# Patient Record
Sex: MALE | Race: WHITE | Employment: OTHER | URBAN - METROPOLITAN AREA
[De-identification: names, ages, dates, MRNs, and addresses within clinical notes are randomized per-mention and may not be internally consistent; named-entity substitution may affect disease eponyms.]

---

## 2017-02-14 RX ORDER — ALLOPURINOL 300 MG/1
TABLET ORAL
Qty: 90 TABLET | Refills: 1 | Status: SHIPPED | OUTPATIENT
Start: 2017-02-14 | End: 2017-05-25

## 2017-02-14 RX ORDER — LEVOTHYROXINE SODIUM 0.05 MG/1
TABLET ORAL
Qty: 90 TABLET | Refills: 1 | Status: SHIPPED | OUTPATIENT
Start: 2017-02-14 | End: 2017-06-28

## 2017-02-14 RX ORDER — OMEPRAZOLE 20 MG/1
CAPSULE, DELAYED RELEASE ORAL
Qty: 180 CAPSULE | Refills: 3 | Status: SHIPPED | OUTPATIENT
Start: 2017-02-14 | End: 2017-12-21

## 2017-02-14 RX ORDER — GABAPENTIN 300 MG/1
CAPSULE ORAL
Qty: 180 CAPSULE | Refills: 1 | Status: SHIPPED | OUTPATIENT
Start: 2017-02-14 | End: 2017-05-25

## 2017-02-27 ENCOUNTER — LAB ENCOUNTER (OUTPATIENT)
Dept: LAB | Age: 62
End: 2017-02-27
Attending: FAMILY MEDICINE
Payer: MEDICARE

## 2017-02-27 DIAGNOSIS — I10 HYPERTENSION, BENIGN: Primary | ICD-10-CM

## 2017-02-27 DIAGNOSIS — G62.9 PERIPHERAL NEUROPATHY: ICD-10-CM

## 2017-02-27 DIAGNOSIS — E55.9 VITAMIN D DEFICIENCY: ICD-10-CM

## 2017-04-28 RX ORDER — FUROSEMIDE 80 MG
TABLET ORAL
Qty: 90 TABLET | Refills: 0 | Status: SHIPPED | OUTPATIENT
Start: 2017-04-28 | End: 2017-05-25

## 2017-04-28 RX ORDER — CITALOPRAM 40 MG/1
TABLET ORAL
Qty: 90 TABLET | Refills: 0 | Status: SHIPPED | OUTPATIENT
Start: 2017-04-28 | End: 2017-05-25

## 2017-04-28 RX ORDER — SPIRONOLACTONE 100 MG/1
TABLET, FILM COATED ORAL
Qty: 90 TABLET | Refills: 0 | Status: SHIPPED | OUTPATIENT
Start: 2017-04-28 | End: 2017-05-25

## 2017-04-28 RX ORDER — NAPROXEN 500 MG/1
TABLET ORAL
Qty: 180 TABLET | Refills: 0 | Status: SHIPPED | OUTPATIENT
Start: 2017-04-28 | End: 2017-05-25

## 2017-05-12 ENCOUNTER — TELEPHONE (OUTPATIENT)
Dept: FAMILY MEDICINE CLINIC | Facility: CLINIC | Age: 62
End: 2017-05-12

## 2017-05-12 NOTE — TELEPHONE ENCOUNTER
Fax received from [employer] on [address] In [place] asking for medication change request for Dewayne Tellez.   Medication is not covered by patient's insurance and would like Dr. Srini Farrell to consider changing the medication to symbicort or advair, both are coverd for

## 2017-05-14 RX ORDER — BUDESONIDE AND FORMOTEROL FUMARATE DIHYDRATE 80; 4.5 UG/1; UG/1
2 AEROSOL RESPIRATORY (INHALATION) 2 TIMES DAILY
Qty: 1 INHALER | Refills: 3 | Status: SHIPPED | OUTPATIENT
Start: 2017-05-14 | End: 2017-11-10

## 2017-05-26 RX ORDER — FUROSEMIDE 80 MG
TABLET ORAL
Qty: 90 TABLET | Refills: 0 | Status: SHIPPED | OUTPATIENT
Start: 2017-05-26 | End: 2017-09-11

## 2017-05-26 RX ORDER — SPIRONOLACTONE 100 MG/1
TABLET, FILM COATED ORAL
Qty: 90 TABLET | Refills: 0 | Status: SHIPPED | OUTPATIENT
Start: 2017-05-26 | End: 2017-09-11

## 2017-05-26 RX ORDER — ALLOPURINOL 300 MG/1
TABLET ORAL
Qty: 90 TABLET | Refills: 1 | Status: SHIPPED | OUTPATIENT
Start: 2017-05-26 | End: 2017-12-21

## 2017-05-26 RX ORDER — GABAPENTIN 300 MG/1
CAPSULE ORAL
Qty: 180 CAPSULE | Refills: 1 | Status: SHIPPED | OUTPATIENT
Start: 2017-05-26 | End: 2017-10-03

## 2017-05-26 RX ORDER — NAPROXEN 500 MG/1
TABLET ORAL
Qty: 180 TABLET | Refills: 0 | Status: SHIPPED | OUTPATIENT
Start: 2017-05-26 | End: 2017-09-11

## 2017-05-26 RX ORDER — CITALOPRAM 40 MG/1
TABLET ORAL
Qty: 90 TABLET | Refills: 0 | Status: SHIPPED | OUTPATIENT
Start: 2017-05-26 | End: 2017-09-11

## 2017-06-28 RX ORDER — NYSTATIN 100000 U/G
1 CREAM TOPICAL AS NEEDED
COMMUNITY
Start: 2013-07-10 | End: 2017-11-06

## 2017-06-28 RX ORDER — ALBUTEROL SULFATE 90 UG/1
2 AEROSOL, METERED RESPIRATORY (INHALATION) EVERY 6 HOURS PRN
COMMUNITY
Start: 2013-07-10 | End: 2017-09-09

## 2017-06-28 RX ORDER — AMIODARONE HYDROCHLORIDE 200 MG/1
100 TABLET ORAL DAILY
COMMUNITY
End: 2019-03-20

## 2017-06-29 RX ORDER — LEVOTHYROXINE SODIUM 0.05 MG/1
TABLET ORAL
Qty: 90 TABLET | Refills: 1 | Status: SHIPPED | OUTPATIENT
Start: 2017-06-29 | End: 2017-10-03 | Stop reason: DRUGHIGH

## 2017-09-07 ENCOUNTER — LABORATORY ENCOUNTER (OUTPATIENT)
Dept: LAB | Age: 62
End: 2017-09-07
Attending: FAMILY MEDICINE
Payer: MEDICARE

## 2017-09-07 DIAGNOSIS — I10 HYPERTENSION, BENIGN: ICD-10-CM

## 2017-09-07 DIAGNOSIS — E55.9 VITAMIN D DEFICIENCY: ICD-10-CM

## 2017-09-07 DIAGNOSIS — G62.9 PERIPHERAL NEUROPATHY: ICD-10-CM

## 2017-09-07 LAB
25-HYDROXYVITAMIN D (TOTAL): 44.5 NG/ML (ref 30–100)
ALBUMIN SERPL-MCNC: 3.1 G/DL (ref 3.5–4.8)
ALP LIVER SERPL-CCNC: 117 U/L (ref 45–117)
ALT SERPL-CCNC: 19 U/L (ref 17–63)
AST SERPL-CCNC: 15 U/L (ref 15–41)
BASOPHILS # BLD AUTO: 0.11 X10(3) UL (ref 0–0.1)
BASOPHILS NFR BLD AUTO: 0.9 %
BILIRUB SERPL-MCNC: 0.4 MG/DL (ref 0.1–2)
BUN BLD-MCNC: 27 MG/DL (ref 8–20)
CALCIUM BLD-MCNC: 9.3 MG/DL (ref 8.3–10.3)
CHLORIDE: 102 MMOL/L (ref 101–111)
CO2: 29 MMOL/L (ref 22–32)
CREAT BLD-MCNC: 2 MG/DL (ref 0.7–1.3)
EOSINOPHIL # BLD AUTO: 0.63 X10(3) UL (ref 0–0.3)
EOSINOPHIL NFR BLD AUTO: 5.3 %
ERYTHROCYTE [DISTWIDTH] IN BLOOD BY AUTOMATED COUNT: 14.6 % (ref 11.5–16)
FOLATE (FOLIC ACID), SERUM: 9 NG/ML (ref 8.7–24)
GLUCOSE BLD-MCNC: 87 MG/DL (ref 70–99)
HAV AB SERPL IA-ACNC: 1124 PG/ML (ref 193–986)
HCT VFR BLD AUTO: 43.7 % (ref 37–53)
HGB BLD-MCNC: 13.9 G/DL (ref 13–17)
IMMATURE GRANULOCYTE COUNT: 0.05 X10(3) UL (ref 0–1)
IMMATURE GRANULOCYTE RATIO %: 0.4 %
LYMPHOCYTES # BLD AUTO: 1.94 X10(3) UL (ref 0.9–4)
LYMPHOCYTES NFR BLD AUTO: 16.4 %
M PROTEIN MFR SERPL ELPH: 7.7 G/DL (ref 6.1–8.3)
MCH RBC QN AUTO: 31.9 PG (ref 27–33.2)
MCHC RBC AUTO-ENTMCNC: 31.8 G/DL (ref 31–37)
MCV RBC AUTO: 100.2 FL (ref 80–99)
MONOCYTES # BLD AUTO: 0.9 X10(3) UL (ref 0.1–0.6)
MONOCYTES NFR BLD AUTO: 7.6 %
NEUTROPHIL ABS PRELIM: 8.19 X10 (3) UL (ref 1.3–6.7)
NEUTROPHILS # BLD AUTO: 8.19 X10(3) UL (ref 1.3–6.7)
NEUTROPHILS NFR BLD AUTO: 69.4 %
PLATELET # BLD AUTO: 228 10(3)UL (ref 150–450)
POTASSIUM SERPL-SCNC: 4.2 MMOL/L (ref 3.6–5.1)
RBC # BLD AUTO: 4.36 X10(6)UL (ref 4.3–5.7)
RED CELL DISTRIBUTION WIDTH-SD: 52.9 FL (ref 35.1–46.3)
SODIUM SERPL-SCNC: 139 MMOL/L (ref 136–144)
TSI SER-ACNC: 1.33 MIU/ML (ref 0.35–5.5)
WBC # BLD AUTO: 11.8 X10(3) UL (ref 4–13)

## 2017-09-07 PROCEDURE — 36415 COLL VENOUS BLD VENIPUNCTURE: CPT | Performed by: FAMILY MEDICINE

## 2017-09-07 PROCEDURE — 82607 VITAMIN B-12: CPT | Performed by: FAMILY MEDICINE

## 2017-09-07 PROCEDURE — 82306 VITAMIN D 25 HYDROXY: CPT | Performed by: FAMILY MEDICINE

## 2017-09-07 PROCEDURE — 80053 COMPREHEN METABOLIC PANEL: CPT | Performed by: FAMILY MEDICINE

## 2017-09-07 PROCEDURE — 82746 ASSAY OF FOLIC ACID SERUM: CPT | Performed by: FAMILY MEDICINE

## 2017-09-07 PROCEDURE — 85025 COMPLETE CBC W/AUTO DIFF WBC: CPT | Performed by: FAMILY MEDICINE

## 2017-09-07 PROCEDURE — 84443 ASSAY THYROID STIM HORMONE: CPT | Performed by: FAMILY MEDICINE

## 2017-09-11 ENCOUNTER — OFFICE VISIT (OUTPATIENT)
Dept: FAMILY MEDICINE CLINIC | Facility: CLINIC | Age: 62
End: 2017-09-11

## 2017-09-11 VITALS
HEIGHT: 67.5 IN | SYSTOLIC BLOOD PRESSURE: 120 MMHG | BODY MASS INDEX: 48.86 KG/M2 | DIASTOLIC BLOOD PRESSURE: 70 MMHG | WEIGHT: 315 LBS | OXYGEN SATURATION: 98 % | TEMPERATURE: 98 F | RESPIRATION RATE: 17 BRPM | HEART RATE: 64 BPM

## 2017-09-11 DIAGNOSIS — E66.01 MORBID OBESITY (HCC): ICD-10-CM

## 2017-09-11 DIAGNOSIS — J44.9 COPD, SEVERE (HCC): ICD-10-CM

## 2017-09-11 DIAGNOSIS — E03.9 ACQUIRED HYPOTHYROIDISM: Primary | ICD-10-CM

## 2017-09-11 PROCEDURE — 99214 OFFICE O/P EST MOD 30 MIN: CPT | Performed by: FAMILY MEDICINE

## 2017-09-11 RX ORDER — LEVOTHYROXINE SODIUM 75 UG/1
75 CAPSULE ORAL
Qty: 90 CAPSULE | Refills: 3 | Status: SHIPPED | OUTPATIENT
Start: 2017-09-11 | End: 2017-09-20

## 2017-09-11 NOTE — PROGRESS NOTES
Chief Complaint:   Patient presents with:  Lab Results: discuss lab results      HPI:   This is a 57year old male coming in for follow-up appointment regarding hypothyroidism and morbid obesity. I reviewed his laboratory testing.   His thyroid numbers a 0.90 (H) 0.10 - 0.60 x10(3) uL   Eosinophil Absolute 0.63 (H) 0.00 - 0.30 x10(3) uL   Basophil Absolute 0.11 (H) 0.00 - 0.10 x10(3) uL   Immature Granulocyte Absolute 0.05 0.00 - 1.00 x10(3) uL   Neutrophil % 69.4 %   Lymphocyte % 16.4 %   Monocyte % 7.6 % Disp: 90 tablet Rfl: 0   NAPROXEN 500 MG Oral Tab TAKE 1 TABLET TWICE DAILY WITH FOOD Disp: 180 tablet Rfl: 0   CITALOPRAM HYDROBROMIDE 40 MG Oral Tab TAKE 1 TABLET EVERY DAY Disp: 90 tablet Rfl: 0   FUROSEMIDE 80 MG Oral Tab TAKE 1 TABLET EVERY DAY Disp: oriented, well developed, well nourished  male   , no apparent distress. HEENT:  Head:  Normocephalic, atraumatic    Mouth:  No oral lesions or ulcerations, poor dentition. NECK: Supple,  no JVD, no thyromegaly.   SKIN: stasis dermatitis LEs   No rashes anemia     Osteoarthrosis     Anxiety and depression     Periodic limb movement disorder     Edema     Hereditary and idiopathic peripheral neuropathy     Sleep apnea     Sleep arousal disorder     Vitamin D deficiency     Need for Streptococcus pneumoniae

## 2017-09-11 NOTE — PATIENT INSTRUCTIONS
Continue with current furosemide dosing     Increase thyroid replacement. Recheck of labs in about 3 months.

## 2017-09-12 RX ORDER — NAPROXEN 500 MG/1
TABLET ORAL
Qty: 180 TABLET | Refills: 0 | Status: SHIPPED | OUTPATIENT
Start: 2017-09-12 | End: 2017-12-21

## 2017-09-12 RX ORDER — CITALOPRAM 40 MG/1
TABLET ORAL
Qty: 90 TABLET | Refills: 1 | Status: SHIPPED | OUTPATIENT
Start: 2017-09-12 | End: 2017-12-20

## 2017-09-12 RX ORDER — SPIRONOLACTONE 100 MG/1
TABLET, FILM COATED ORAL
Qty: 90 TABLET | Refills: 0 | Status: SHIPPED | OUTPATIENT
Start: 2017-09-12 | End: 2017-12-21

## 2017-09-12 RX ORDER — FUROSEMIDE 80 MG
TABLET ORAL
Qty: 90 TABLET | Refills: 0 | Status: SHIPPED | OUTPATIENT
Start: 2017-09-12 | End: 2017-12-21

## 2017-09-20 ENCOUNTER — TELEPHONE (OUTPATIENT)
Dept: FAMILY MEDICINE CLINIC | Facility: CLINIC | Age: 62
End: 2017-09-20

## 2017-09-20 RX ORDER — LEVOTHYROXINE SODIUM 75 UG/1
75 CAPSULE ORAL
Qty: 90 CAPSULE | Refills: 3 | Status: SHIPPED | OUTPATIENT
Start: 2017-09-20 | End: 2017-12-20

## 2017-09-20 NOTE — TELEPHONE ENCOUNTER
Wife states that the insurance will not cover levothyroxine, it would cost $75 for a 30 day through [address].       I called [address] [place] [address] and they told me that if the patient has the levothyroxine filled through [facility]. it would cost  $18 for a 90 d

## 2017-10-03 ENCOUNTER — OFFICE VISIT (OUTPATIENT)
Dept: FAMILY MEDICINE CLINIC | Facility: CLINIC | Age: 62
End: 2017-10-03

## 2017-10-03 VITALS
SYSTOLIC BLOOD PRESSURE: 118 MMHG | BODY MASS INDEX: 48.86 KG/M2 | TEMPERATURE: 98 F | RESPIRATION RATE: 18 BRPM | DIASTOLIC BLOOD PRESSURE: 64 MMHG | HEART RATE: 64 BPM | HEIGHT: 67.5 IN | WEIGHT: 315 LBS

## 2017-10-03 DIAGNOSIS — L03.116 CELLULITIS OF LEFT LOWER EXTREMITY: Primary | ICD-10-CM

## 2017-10-03 PROCEDURE — 99214 OFFICE O/P EST MOD 30 MIN: CPT | Performed by: FAMILY MEDICINE

## 2017-10-03 RX ORDER — GABAPENTIN 300 MG/1
300 CAPSULE ORAL 3 TIMES DAILY
COMMUNITY
End: 2017-12-20

## 2017-10-03 RX ORDER — SULFAMETHOXAZOLE AND TRIMETHOPRIM 800; 160 MG/1; MG/1
1 TABLET ORAL 2 TIMES DAILY
Qty: 28 TABLET | Refills: 0 | Status: SHIPPED | OUTPATIENT
Start: 2017-10-03 | End: 2017-10-17

## 2017-10-03 NOTE — PROGRESS NOTES
Chief Complaint:   Patient presents with:  Leg Swelling: Patient stated he thinks he has cellulitis, left leg is swollen and getting worse      HPI:   This is a 57year old male coming in for redness and swelling in the left lower leg initially began aroun 8.19 (H) 1.30 - 6.70 x10(3) uL   Lymphocyte Absolute 1.94 0.90 - 4.00 x10(3) uL   Monocyte Absolute 0.90 (H) 0.10 - 0.60 x10(3) uL   Eosinophil Absolute 0.63 (H) 0.00 - 0.30 x10(3) uL   Basophil Absolute 0.11 (H) 0.00 - 0.10 x10(3) uL   Immature Granulocyt Oral Tab Take 2 tablets by mouth Q12H. Disp:  Rfl:    nystatin [id number] UNIT/GM External Cream Apply 1 Application topically as needed.  Disp:  Rfl:    ALLOPURINOL 300 MG Oral Tab TAKE 1 TABLET EVERY DAY Disp: 90 tablet Rfl: 1   Budesonide-Formoterol Fumarate lower leg, moderate swelling , dry chronic dermaitis on feet and heels. Left lateral thigh with erythema. No rashes, no skin lesion, no bruising, good turgor.     HEART:  Regular rate and rhythm, no murmurs,   LUNGS: Clear to auscultation bilterally, no Streptococcus pneumoniae vaccination      Braeden Flynn MD  10/3/2017  8:58 AM

## 2017-10-17 ENCOUNTER — OFFICE VISIT (OUTPATIENT)
Dept: FAMILY MEDICINE CLINIC | Facility: CLINIC | Age: 62
End: 2017-10-17

## 2017-10-17 VITALS
BODY MASS INDEX: 48.86 KG/M2 | DIASTOLIC BLOOD PRESSURE: 70 MMHG | HEIGHT: 67.5 IN | RESPIRATION RATE: 24 BRPM | TEMPERATURE: 97 F | HEART RATE: 74 BPM | WEIGHT: 315 LBS | SYSTOLIC BLOOD PRESSURE: 134 MMHG

## 2017-10-17 DIAGNOSIS — L03.116 CELLULITIS OF LEFT LOWER EXTREMITY: Primary | ICD-10-CM

## 2017-10-17 PROCEDURE — 99213 OFFICE O/P EST LOW 20 MIN: CPT | Performed by: FAMILY MEDICINE

## 2017-10-17 NOTE — PROGRESS NOTES
Chief Complaint:   Patient presents with: Other: 2 week follow up       HPI:   This is a 57year old male coming in for follow-up care regarding his cellulitis. See prior note.     Patient tolerated antibiotic feels that his leg is back to normal he has c uL   Neutrophil % 69.4 %   Lymphocyte % 16.4 %   Monocyte % 7.6 %   Eosinophil % 5.3 %   Basophil % 0.9 %   Immature Granulocyte % 0.4 %       Past Medical History (reviewed - no changes required):   morbid obesity;       marked pannus and plans for surger Inhalation Aerosol Inhale 2 puffs into the lungs 2 (two) times daily.  Disp: 1 Inhaler Rfl: 3   OMEPRAZOLE 20 MG Oral Capsule Delayed Release TAKE 1 CAPSULE TWICE DAILY Disp: 180 capsule Rfl: 3        Allergies:    Amlodipine                  Comment:Other cyanosis,FROM, 2+ dorsalis pedis pulses bilaterally. NEURO:  No deficit, normal gait, strength and tone, sensory intact,   PSYCH: mood is down today      ASSESSMENT AND PLAN:   1. Cellulitis of left lower extremity  Improved.       Patient Instructions

## 2017-10-20 ENCOUNTER — TELEPHONE (OUTPATIENT)
Dept: FAMILY MEDICINE CLINIC | Facility: CLINIC | Age: 62
End: 2017-10-20

## 2017-10-20 RX ORDER — CYCLOBENZAPRINE HCL 10 MG
10 TABLET ORAL 3 TIMES DAILY
Qty: 30 TABLET | Refills: 1 | Status: SHIPPED | OUTPATIENT
Start: 2017-10-20 | End: 2017-11-09

## 2017-10-20 NOTE — TELEPHONE ENCOUNTER
Spoke with wife  patient threw his back out emptying  yesterday. [facility] has had ongoing back problems and  is aware of this. [facility] is requesting a refill of Flexeril.  Not on patient's current med list but, has had this in the past. M

## 2017-10-20 NOTE — TELEPHONE ENCOUNTER
wife states that patient threw his out and needs to come in to see him but there is no availability today or tomorrow and they do not want to see anyone else - [facility] they can make appt with dr enamorado- will call back at a later t

## 2017-10-20 NOTE — TELEPHONE ENCOUNTER
Chart reviewed     [id number] [address] [place] for refill of flexeril ( cyclobenzaprine - generic)    Sent script in . Recommend appointment next week.

## 2017-11-06 NOTE — TELEPHONE ENCOUNTER
Future appt:     Your appointments     Date & Time Appointment Department [facility])    Dec 14, 2017 10:45 AM CST Laboratory Visit with REF Hayden  Reference Lab (EDW Ref Lab [facility])    Dec 20, 2017  4:00 PM CST Physical - Established Patient with R

## 2017-11-07 RX ORDER — NYSTATIN 100000 U/G
1 CREAM TOPICAL AS NEEDED
Qty: 30 G | Refills: 2 | Status: SHIPPED | OUTPATIENT
Start: 2017-11-07 | End: 2021-08-25

## 2017-11-10 RX ORDER — BUDESONIDE AND FORMOTEROL FUMARATE DIHYDRATE 80; 4.5 UG/1; UG/1
AEROSOL RESPIRATORY (INHALATION)
Qty: 1 INHALER | Refills: 3 | Status: SHIPPED | OUTPATIENT
Start: 2017-11-10 | End: 2018-07-12

## 2017-11-10 NOTE — TELEPHONE ENCOUNTER
Future appt:     Your appointments     Date & Time Appointment Department [facility])    Dec 14, 2017 10:45 AM CST Laboratory Visit with REF Paola Knapp Reference Lab (EDW Ref Lab [facility])    Dec 20, 2017  4:00 PM CST Physical - Established Patient with R

## 2017-12-13 ENCOUNTER — TELEPHONE (OUTPATIENT)
Dept: FAMILY MEDICINE CLINIC | Facility: CLINIC | Age: 62
End: 2017-12-13

## 2017-12-13 NOTE — TELEPHONE ENCOUNTER
Lab order needed for appt on 12/14/17, please advise    Future Appointments  Date Time Provider Shyam Fuentes   12/14/2017 10:45 AM [facility] [place] [facility]   12/20/2017 4:00 PM Rosita Schmid MD [facility]

## 2017-12-14 ENCOUNTER — LABORATORY ENCOUNTER (OUTPATIENT)
Dept: LAB | Age: 62
End: 2017-12-14
Attending: FAMILY MEDICINE
Payer: MEDICARE

## 2017-12-14 DIAGNOSIS — E03.9 ACQUIRED HYPOTHYROIDISM: ICD-10-CM

## 2017-12-14 DIAGNOSIS — D50.8 IRON DEFICIENCY ANEMIA SECONDARY TO INADEQUATE DIETARY IRON INTAKE: ICD-10-CM

## 2017-12-14 DIAGNOSIS — Z00.00 HEALTH CARE MAINTENANCE: ICD-10-CM

## 2017-12-14 DIAGNOSIS — N18.2 CHRONIC RENAL IMPAIRMENT, STAGE 2 (MILD): ICD-10-CM

## 2017-12-14 PROCEDURE — 80053 COMPREHEN METABOLIC PANEL: CPT | Performed by: FAMILY MEDICINE

## 2017-12-14 PROCEDURE — 36415 COLL VENOUS BLD VENIPUNCTURE: CPT | Performed by: FAMILY MEDICINE

## 2017-12-14 PROCEDURE — G0103 PSA SCREENING: HCPCS | Performed by: FAMILY MEDICINE

## 2017-12-20 ENCOUNTER — OFFICE VISIT (OUTPATIENT)
Dept: FAMILY MEDICINE CLINIC | Facility: CLINIC | Age: 62
End: 2017-12-20

## 2017-12-20 VITALS
DIASTOLIC BLOOD PRESSURE: 68 MMHG | TEMPERATURE: 99 F | BODY MASS INDEX: 48.86 KG/M2 | RESPIRATION RATE: 18 BRPM | WEIGHT: 315 LBS | SYSTOLIC BLOOD PRESSURE: 130 MMHG | HEART RATE: 78 BPM | HEIGHT: 67.5 IN

## 2017-12-20 DIAGNOSIS — I73.00 RAYNAUD'S PHENOMENON WITHOUT GANGRENE: ICD-10-CM

## 2017-12-20 DIAGNOSIS — E03.9 ACQUIRED HYPOTHYROIDISM: ICD-10-CM

## 2017-12-20 DIAGNOSIS — N18.30 CHRONIC RENAL INSUFFICIENCY, STAGE 3 (MODERATE) (HCC): ICD-10-CM

## 2017-12-20 DIAGNOSIS — J44.9 COPD, SEVERE (HCC): ICD-10-CM

## 2017-12-20 DIAGNOSIS — Z00.00 HEALTH CARE MAINTENANCE: Primary | ICD-10-CM

## 2017-12-20 DIAGNOSIS — E66.01 MORBID OBESITY (HCC): ICD-10-CM

## 2017-12-20 DIAGNOSIS — M19.012 PRIMARY OSTEOARTHRITIS OF LEFT SHOULDER: ICD-10-CM

## 2017-12-20 PROCEDURE — G0008 ADMIN INFLUENZA VIRUS VAC: HCPCS | Performed by: FAMILY MEDICINE

## 2017-12-20 PROCEDURE — 90686 IIV4 VACC NO PRSV 0.5 ML IM: CPT | Performed by: FAMILY MEDICINE

## 2017-12-20 PROCEDURE — 96160 PT-FOCUSED HLTH RISK ASSMT: CPT | Performed by: FAMILY MEDICINE

## 2017-12-20 RX ORDER — CITALOPRAM 40 MG/1
40 TABLET ORAL
Qty: 90 TABLET | Refills: 3 | Status: SHIPPED | OUTPATIENT
Start: 2017-12-20 | End: 2017-12-20

## 2017-12-20 RX ORDER — GABAPENTIN 300 MG/1
300 CAPSULE ORAL 3 TIMES DAILY
Qty: 90 CAPSULE | Refills: 3 | Status: SHIPPED | OUTPATIENT
Start: 2017-12-20 | End: 2017-12-21

## 2017-12-20 RX ORDER — LEVOTHYROXINE SODIUM 75 UG/1
75 CAPSULE ORAL
Qty: 90 CAPSULE | Refills: 3 | Status: SHIPPED | OUTPATIENT
Start: 2017-12-20 | End: 2021-08-25

## 2017-12-20 RX ORDER — GABAPENTIN 300 MG/1
300 CAPSULE ORAL 3 TIMES DAILY
Qty: 90 CAPSULE | Refills: 3 | Status: SHIPPED | OUTPATIENT
Start: 2017-12-20 | End: 2017-12-20

## 2017-12-20 RX ORDER — LEVOTHYROXINE SODIUM 75 UG/1
75 CAPSULE ORAL
Qty: 90 CAPSULE | Refills: 3 | Status: SHIPPED | OUTPATIENT
Start: 2017-12-20 | End: 2017-12-20

## 2017-12-20 RX ORDER — CITALOPRAM 40 MG/1
40 TABLET ORAL
Qty: 90 TABLET | Refills: 3 | Status: SHIPPED | OUTPATIENT
Start: 2017-12-20 | End: 2018-11-21

## 2017-12-20 NOTE — PROGRESS NOTES
Chief Complaint:   Patient presents with: Well Adult: Medicare wellness exam      HPI:   This is a 57year old male coming in for healthcare check along with review of chronic illnesses and new complaints.     I reviewed with him heart disease prevention, AST 14 (L) 15 - 41 U/L   Alt 15 (L) 17 - 63 U/L   Bilirubin, Total 0.5 0.1 - 2.0 mg/dL   Total Protein 8.0 6.1 - 8.3 g/dL   Albumin 3.4 (L) 3.5 - 4.8 g/dL   Sodium 139 136 - 144 mmol/L   Potassium 3.5 (L) 3.6 - 5.1 mmol/L   Chloride 100 (L) 101 - 111 mmo Comment:Other reaction(s): legs swell  Benazepril                  Comment:Other reaction(s): cronic cough       REVIEW OF SYSTEMS:   CONSTITUTIONAL:  Denies , fever, chills, or fatigue,    EENT:  Eyes:  Denies eye pain, visual changes,   Ears, Nos auscultation bilterally, no rales/rhonchi/wheezing. ABDOMEN:  Soft, nondistended, nontender, bowel sounds normal in all 4 quadrants, no masses, no hepatosplenomegaly. : deferred by patient    BACK: No tenderness, no spasm, SLR test negative, FROM.   E any questions, complications, allergies, or worsening or changing symptoms. Patient is to call with any side effects or complications from the treatments as a result of today.      Problem List:  Patient Active Problem List:     Morbid obesity ([facility] [address]     An

## 2017-12-20 NOTE — PATIENT INSTRUCTIONS
Good exam     Continue with citaloprim    Continue with good work with healthy nutrition     Recheck of labs (kidney function ) in 3 months - drink more water. Obtain xray of shoulder. Refill for medications.      Recheck with appointment with me in

## 2017-12-21 ENCOUNTER — HOSPITAL ENCOUNTER (OUTPATIENT)
Dept: GENERAL RADIOLOGY | Age: 62
Discharge: HOME | End: 2017-12-21
Attending: FAMILY MEDICINE
Payer: MEDICARE

## 2017-12-21 ENCOUNTER — TELEPHONE (OUTPATIENT)
Dept: FAMILY MEDICINE CLINIC | Facility: CLINIC | Age: 62
End: 2017-12-21

## 2017-12-21 DIAGNOSIS — M19.012 PRIMARY OSTEOARTHRITIS OF LEFT SHOULDER: ICD-10-CM

## 2017-12-21 PROBLEM — F32.4 MAJOR DEPRESSION IN PARTIAL REMISSION (HCC): Chronic | Status: ACTIVE | Noted: 2017-12-21

## 2017-12-21 PROCEDURE — 73030 X-RAY EXAM OF SHOULDER: CPT | Performed by: FAMILY MEDICINE

## 2017-12-21 NOTE — TELEPHONE ENCOUNTER
Patient's  wife calling stating needs refill of meds to [facility] states okay to wait for Dr. Neelima Beach    Future appt:     Your appointments     Date & Time Appointment Department [facility])    Mar 20, 2018 11:45 AM CDT Laboratory Visit with ALIREZA Darden

## 2017-12-21 NOTE — TELEPHONE ENCOUNTER
Spouse notified three Rx's that were sent [employer]  Levothyroxine, citalopram, and gabapentin. States will call pharmacy to verify.

## 2017-12-21 NOTE — TELEPHONE ENCOUNTER
Prescriptions went to [place], should of went to [facility]. The inhalers only go to [place], questions call the spouse.

## 2017-12-22 ENCOUNTER — TELEPHONE (OUTPATIENT)
Dept: FAMILY MEDICINE CLINIC | Facility: CLINIC | Age: 62
End: 2017-12-22

## 2017-12-22 RX ORDER — OMEPRAZOLE 20 MG/1
CAPSULE, DELAYED RELEASE ORAL
Qty: 180 CAPSULE | Refills: 3 | Status: SHIPPED | OUTPATIENT
Start: 2017-12-22 | End: 2018-11-21

## 2017-12-22 RX ORDER — SPIRONOLACTONE 100 MG/1
100 TABLET, FILM COATED ORAL
Qty: 90 TABLET | Refills: 0 | Status: SHIPPED | OUTPATIENT
Start: 2017-12-22 | End: 2017-12-22

## 2017-12-22 RX ORDER — FUROSEMIDE 80 MG
80 TABLET ORAL
Qty: 90 TABLET | Refills: 3 | Status: SHIPPED | OUTPATIENT
Start: 2017-12-22 | End: 2017-12-22

## 2017-12-22 RX ORDER — OMEPRAZOLE 20 MG/1
CAPSULE, DELAYED RELEASE ORAL
Qty: 180 CAPSULE | Refills: 3 | Status: SHIPPED | OUTPATIENT
Start: 2017-12-22 | End: 2017-12-22

## 2017-12-22 RX ORDER — NAPROXEN 500 MG/1
TABLET ORAL
Qty: 180 TABLET | Refills: 0 | Status: SHIPPED | OUTPATIENT
Start: 2017-12-22 | End: 2019-03-27

## 2017-12-22 RX ORDER — FUROSEMIDE 80 MG
80 TABLET ORAL
Qty: 90 TABLET | Refills: 3 | Status: SHIPPED | OUTPATIENT
Start: 2017-12-22 | End: 2018-11-21

## 2017-12-22 RX ORDER — ALLOPURINOL 300 MG/1
300 TABLET ORAL
Qty: 90 TABLET | Refills: 3 | Status: SHIPPED | OUTPATIENT
Start: 2017-12-22 | End: 2017-12-22

## 2017-12-22 RX ORDER — GABAPENTIN 300 MG/1
300 CAPSULE ORAL 3 TIMES DAILY
Qty: 90 CAPSULE | Refills: 3 | Status: SHIPPED | OUTPATIENT
Start: 2017-12-22 | End: 2017-12-22

## 2017-12-22 RX ORDER — SPIRONOLACTONE 100 MG/1
100 TABLET, FILM COATED ORAL
Qty: 90 TABLET | Refills: 3 | Status: SHIPPED | OUTPATIENT
Start: 2017-12-22 | End: 2018-11-21

## 2017-12-22 RX ORDER — ALLOPURINOL 300 MG/1
300 TABLET ORAL
Qty: 90 TABLET | Refills: 3 | Status: SHIPPED | OUTPATIENT
Start: 2017-12-22 | End: 2018-11-21

## 2017-12-22 RX ORDER — NAPROXEN 500 MG/1
TABLET ORAL
Qty: 180 TABLET | Refills: 0 | Status: SHIPPED | OUTPATIENT
Start: 2017-12-22 | End: 2017-12-22

## 2017-12-22 RX ORDER — GABAPENTIN 300 MG/1
300 CAPSULE ORAL 3 TIMES DAILY
Qty: 90 CAPSULE | Refills: 3 | Status: SHIPPED | OUTPATIENT
Start: 2017-12-22 | End: 2018-08-24

## 2017-12-22 NOTE — TELEPHONE ENCOUNTER
Wife states that the scripts should go to [address]. Scripts were cancelled at [facility].

## 2017-12-26 ENCOUNTER — TELEPHONE (OUTPATIENT)
Dept: FAMILY MEDICINE CLINIC | Facility: CLINIC | Age: 62
End: 2017-12-26

## 2017-12-26 DIAGNOSIS — R93.89 ABNORMAL X-RAY: Primary | ICD-10-CM

## 2017-12-26 NOTE — TELEPHONE ENCOUNTER
Patient informed of recent xray results and recommendations as per Dr. Ramiro Groves. Referral sent.

## 2017-12-26 NOTE — TELEPHONE ENCOUNTER
----- Message from Keisha Mark MD sent at 12/22/2017 12:06 PM CST -----  Xray report with advanced arthritis in shoulder     Recommend referral to dr. Janusz Moura

## 2018-01-22 ENCOUNTER — TELEPHONE (OUTPATIENT)
Dept: FAMILY MEDICINE CLINIC | Facility: CLINIC | Age: 63
End: 2018-01-22

## 2018-01-22 NOTE — TELEPHONE ENCOUNTER
had xray on shoulder, specialist not covered, Warren Almeida or Dr Katerine Hernandez these two are, need new referral and report before an appointment can be made phone number for them is [phone], the pain is so bad at night he fortunato and has been gi

## 2018-03-20 ENCOUNTER — APPOINTMENT (OUTPATIENT)
Dept: LAB | Age: 63
End: 2018-03-20
Attending: FAMILY MEDICINE
Payer: MEDICARE

## 2018-03-20 DIAGNOSIS — N18.30 CHRONIC RENAL INSUFFICIENCY, STAGE 3 (MODERATE) (HCC): ICD-10-CM

## 2018-03-20 DIAGNOSIS — N18.2 CHRONIC RENAL IMPAIRMENT, STAGE 2 (MILD): ICD-10-CM

## 2018-03-20 LAB
ALBUMIN SERPL-MCNC: 3.2 G/DL (ref 3.5–4.8)
ALP LIVER SERPL-CCNC: 83 U/L (ref 45–117)
ALT SERPL-CCNC: 9 U/L (ref 17–63)
AST SERPL-CCNC: 8 U/L (ref 15–41)
BILIRUB SERPL-MCNC: 0.4 MG/DL (ref 0.1–2)
BILIRUB UR QL STRIP.AUTO: NEGATIVE
BUN BLD-MCNC: 23 MG/DL (ref 8–20)
CALCIUM BLD-MCNC: 8.9 MG/DL (ref 8.3–10.3)
CHLORIDE: 100 MMOL/L (ref 101–111)
CLARITY UR REFRACT.AUTO: CLEAR
CO2: 30 MMOL/L (ref 22–32)
COLOR UR AUTO: YELLOW
CREAT BLD-MCNC: 1.72 MG/DL (ref 0.7–1.3)
GLUCOSE BLD-MCNC: 89 MG/DL (ref 70–99)
GLUCOSE UR STRIP.AUTO-MCNC: NEGATIVE MG/DL
KETONES UR STRIP.AUTO-MCNC: NEGATIVE MG/DL
LEUKOCYTE ESTERASE UR QL STRIP.AUTO: NEGATIVE
M PROTEIN MFR SERPL ELPH: 7.4 G/DL (ref 6.1–8.3)
NITRITE UR QL STRIP.AUTO: NEGATIVE
PH UR STRIP.AUTO: 5 [PH] (ref 4.5–8)
POTASSIUM SERPL-SCNC: 3.7 MMOL/L (ref 3.6–5.1)
PROT UR STRIP.AUTO-MCNC: NEGATIVE MG/DL
RBC UR QL AUTO: NEGATIVE
SODIUM SERPL-SCNC: 137 MMOL/L (ref 136–144)
SP GR UR STRIP.AUTO: 1.02 (ref 1–1.03)
UROBILINOGEN UR STRIP.AUTO-MCNC: <2 MG/DL

## 2018-03-20 PROCEDURE — 80053 COMPREHEN METABOLIC PANEL: CPT | Performed by: FAMILY MEDICINE

## 2018-03-20 PROCEDURE — 81003 URINALYSIS AUTO W/O SCOPE: CPT | Performed by: FAMILY MEDICINE

## 2018-03-20 PROCEDURE — 36415 COLL VENOUS BLD VENIPUNCTURE: CPT | Performed by: FAMILY MEDICINE

## 2018-03-21 ENCOUNTER — TELEPHONE (OUTPATIENT)
Dept: FAMILY MEDICINE CLINIC | Facility: CLINIC | Age: 63
End: 2018-03-21

## 2018-03-21 NOTE — TELEPHONE ENCOUNTER
----- Message from Jay Wheeler MD sent at 3/20/2018  5:35 PM CDT -----  Chart improved     Kidney function mildly improved. Continue with trying to increase fluid intake.

## 2018-04-16 RX ORDER — ALLOPURINOL 300 MG/1
TABLET ORAL
Qty: 90 TABLET | Refills: 1 | OUTPATIENT
Start: 2018-04-16

## 2018-04-16 NOTE — TELEPHONE ENCOUNTER
Allopurinol 300 mg tab daily  Last rf 12/22/17 w/ year supply, should not be out    Will deny w/ reason \"refill request too soon\"

## 2018-06-07 ENCOUNTER — TELEPHONE (OUTPATIENT)
Dept: FAMILY MEDICINE CLINIC | Facility: CLINIC | Age: 63
End: 2018-06-07

## 2018-06-07 NOTE — TELEPHONE ENCOUNTER
Is having dental work done and needs clearance that was sent 8 weeks ago returned. Had to cancel appt today because it was not sent.  Will resend request incase you did not get

## 2018-07-12 NOTE — TELEPHONE ENCOUNTER
Future appt: Your appointments     Date & Time Appointment Department [facility])    Jul 20, 2018  3:30 PM CDT Exam - Established Patient with Patricia Noland MD [address] [place] [address] ([facility])    Jul 31, 2018  3:40 PM CDT Sleep Follow Up with Karina Terry MD [address] [place] [address] ([facility])        [address] [place] [address] [facility] 6554 [phone]        Last Appointment:  Visit date not found  No results found for: CHOLEST, HDL, LDL, TRIGLY, TRIG  No results found for: EAG, A1C    Lab Results  Component Value Date   TSH 0.244 (L) 12/14/2017     Last RF:  110/10/2017    No Follow-up on file.

## 2018-07-13 RX ORDER — DILTIAZEM HYDROCHLORIDE 60 MG/1
TABLET, FILM COATED ORAL
Qty: 10.2 INHALER | Refills: 3 | Status: SHIPPED | OUTPATIENT
Start: 2018-07-13 | End: 2018-07-25

## 2018-07-20 ENCOUNTER — OFFICE VISIT (OUTPATIENT)
Dept: FAMILY MEDICINE CLINIC | Facility: CLINIC | Age: 63
End: 2018-07-20
Payer: MEDICARE

## 2018-07-20 ENCOUNTER — TELEPHONE (OUTPATIENT)
Dept: FAMILY MEDICINE CLINIC | Facility: CLINIC | Age: 63
End: 2018-07-20

## 2018-07-20 VITALS
HEART RATE: 78 BPM | TEMPERATURE: 98 F | HEIGHT: 67.5 IN | RESPIRATION RATE: 22 BRPM | DIASTOLIC BLOOD PRESSURE: 62 MMHG | BODY MASS INDEX: 48.86 KG/M2 | WEIGHT: 315 LBS | SYSTOLIC BLOOD PRESSURE: 136 MMHG

## 2018-07-20 DIAGNOSIS — N18.30 CHRONIC RENAL INSUFFICIENCY, STAGE 3 (MODERATE) (HCC): ICD-10-CM

## 2018-07-20 DIAGNOSIS — Z00.00 HEALTH CARE MAINTENANCE: Primary | ICD-10-CM

## 2018-07-20 DIAGNOSIS — F33.41 RECURRENT MAJOR DEPRESSIVE DISORDER, IN PARTIAL REMISSION (HCC): ICD-10-CM

## 2018-07-20 DIAGNOSIS — J44.9 COPD, SEVERE (HCC): ICD-10-CM

## 2018-07-20 DIAGNOSIS — E66.01 MORBID OBESITY (HCC): ICD-10-CM

## 2018-07-20 DIAGNOSIS — Z00.00 ENCOUNTER FOR ANNUAL HEALTH EXAMINATION: ICD-10-CM

## 2018-07-20 PROCEDURE — 96160 PT-FOCUSED HLTH RISK ASSMT: CPT | Performed by: FAMILY MEDICINE

## 2018-07-20 PROCEDURE — G0438 PPPS, INITIAL VISIT: HCPCS | Performed by: FAMILY MEDICINE

## 2018-07-20 NOTE — PATIENT INSTRUCTIONS
Good check    Continue with current medications. Obtain lab in near future. Plan for health check / physical / recheck in Feb. / march 2019.

## 2018-07-20 NOTE — PROGRESS NOTES
Chief Complaint:   Patient presents with: Follow - Up: medication refill      HPI:   This is a 57year old male coming in for healthcare check and review of chronic illnesses.   I discussed with patient heart disease prevention, cancer early detection and 3.5 - 4.8 g/dL   Sodium 137 136 - 144 mmol/L   Potassium 3.7 3.6 - 5.1 mmol/L   Chloride 100 (L) 101 - 111 mmol/L   CO2 30.0 22.0 - 32.0 mmol/L       Past Medical History (reviewed - no changes required):     morbid obesity;       marked pannus and plans f Resp 22   Ht 67.5\"   Wt (!) 390 lb   BMI 60.18 kg/m²  Estimated body mass index is 60.18 kg/m² as calculated from the following:    Height as of this encounter: 67.5\". Weight as of this encounter: 390 lb. Vital signs reviewed. Appears stated age, wel understanding. Patient is notified to call with any questions, complications, allergies, or worsening or changing symptoms. Patient is to call with any side effects or complications from the treatments as a result of today.      Problem List:  Patient Acti

## 2018-07-24 ENCOUNTER — OFFICE VISIT (OUTPATIENT)
Dept: FAMILY MEDICINE CLINIC | Facility: CLINIC | Age: 63
End: 2018-07-24
Payer: MEDICARE

## 2018-07-24 ENCOUNTER — APPOINTMENT (OUTPATIENT)
Dept: LAB | Age: 63
End: 2018-07-24
Attending: FAMILY MEDICINE
Payer: MEDICARE

## 2018-07-24 VITALS
BODY MASS INDEX: 48.86 KG/M2 | WEIGHT: 315 LBS | HEIGHT: 67.5 IN | SYSTOLIC BLOOD PRESSURE: 122 MMHG | TEMPERATURE: 98 F | DIASTOLIC BLOOD PRESSURE: 66 MMHG | HEART RATE: 80 BPM

## 2018-07-24 DIAGNOSIS — G47.33 OBSTRUCTIVE SLEEP APNEA (ADULT) (PEDIATRIC): Primary | ICD-10-CM

## 2018-07-24 DIAGNOSIS — N18.30 CHRONIC RENAL INSUFFICIENCY, STAGE 3 (MODERATE) (HCC): ICD-10-CM

## 2018-07-24 LAB
ALBUMIN SERPL-MCNC: 3.6 G/DL (ref 3.5–4.8)
ALBUMIN/GLOB SERPL: 0.8 {RATIO} (ref 1–2)
ALP LIVER SERPL-CCNC: 87 U/L (ref 45–117)
ALT SERPL-CCNC: 16 U/L (ref 17–63)
ANION GAP SERPL CALC-SCNC: 7 MMOL/L (ref 0–18)
AST SERPL-CCNC: 16 U/L (ref 15–41)
BILIRUB SERPL-MCNC: 0.5 MG/DL (ref 0.1–2)
BUN BLD-MCNC: 24 MG/DL (ref 8–20)
BUN/CREAT SERPL: 14.4 (ref 10–20)
CALCIUM BLD-MCNC: 8.9 MG/DL (ref 8.3–10.3)
CHLORIDE SERPL-SCNC: 101 MMOL/L (ref 101–111)
CO2 SERPL-SCNC: 31 MMOL/L (ref 22–32)
CREAT BLD-MCNC: 1.67 MG/DL (ref 0.7–1.3)
GLOBULIN PLAS-MCNC: 4.4 G/DL (ref 2.5–3.7)
GLUCOSE BLD-MCNC: 81 MG/DL (ref 70–99)
M PROTEIN MFR SERPL ELPH: 8 G/DL (ref 6.1–8.3)
OSMOLALITY SERPL CALC.SUM OF ELEC: 291 MOSM/KG (ref 275–295)
POTASSIUM SERPL-SCNC: 4.1 MMOL/L (ref 3.6–5.1)
SODIUM SERPL-SCNC: 139 MMOL/L (ref 136–144)

## 2018-07-24 PROCEDURE — 99214 OFFICE O/P EST MOD 30 MIN: CPT | Performed by: NURSE PRACTITIONER

## 2018-07-24 PROCEDURE — 80053 COMPREHEN METABOLIC PANEL: CPT

## 2018-07-24 PROCEDURE — 36415 COLL VENOUS BLD VENIPUNCTURE: CPT

## 2018-07-24 NOTE — PROGRESS NOTES
[facility]  SLEEP [facility]ESS [facility]        HPI:   This is a 57year old male coming in for Patient presents with:  Sleep Problem: follow up       HPI:     Present for CPAP follow-up. States that he is having all his teeth pulled in 2 weeks. reaction(s): legs swell  Benazepril                  Comment:Other reaction(s): cronic cough  Current Meds:    Current Outpatient Prescriptions:  Budesonide-Formoterol Fumarate (SYMBICORT) 80-4.5 MCG/ACT Inhalation Aerosol Inhale 2 puffs into the lungs 2 ( movement disorder     Edema     Hereditary and idiopathic peripheral neuropathy     Obstructive sleep apnea (adult) (pediatric)     Sleep arousal disorder     Vitamin D deficiency     Need for Streptococcus pneumoniae vaccination     Chronic renal insuffic heard.  Pulmonary/Chest: Effort normal and breath sounds normal. No respiratory distress. Musculoskeletal: Normal range of motion. Lymphadenopathy:     He has no cervical adenopathy. Neurological: He is alert and oriented to person, place, and time. complications from the treatments as a result of today.        [address] Mock  7/24/2018  1:13 PM

## 2018-07-25 ENCOUNTER — TELEPHONE (OUTPATIENT)
Dept: FAMILY MEDICINE CLINIC | Facility: CLINIC | Age: 63
End: 2018-07-25

## 2018-07-25 RX ORDER — BUDESONIDE AND FORMOTEROL FUMARATE DIHYDRATE 80; 4.5 UG/1; UG/1
2 AEROSOL RESPIRATORY (INHALATION) 2 TIMES DAILY
Qty: 10.2 INHALER | Refills: 3 | COMMUNITY
Start: 2018-07-25 | End: 2019-03-20

## 2018-07-25 NOTE — TELEPHONE ENCOUNTER
----- Message from Barbie Espinoza MD sent at 7/25/2018  8:25 AM CDT -----  Labs reviewed     Kidney function - creatinine - mildly improved. Continue with trying to drink plenty of water. Recheck in February with next office visit.     BS - normal

## 2018-07-25 NOTE — TELEPHONE ENCOUNTER
Pt's wife [facility]) notified of the below results and recommendations from Dr. Heidy Rivera. Verbalized understanding.

## 2018-07-25 NOTE — PATIENT INSTRUCTIONS
Continue using CPAP therapy. In order for medical supplies will be sent to [facility] Recheck in a month after having teeth pulled to ensure CPAP is working for him.     Warned if still with sleep apnea and not using CPAP has 7 fold increased risk and heart

## 2018-08-24 RX ORDER — GABAPENTIN 300 MG/1
300 CAPSULE ORAL 3 TIMES DAILY
Qty: 90 CAPSULE | Refills: 1 | Status: SHIPPED | OUTPATIENT
Start: 2018-08-24 | End: 2018-11-21

## 2018-08-24 NOTE — TELEPHONE ENCOUNTER
Please advise refill of gabapentin.      Future appt:    Last Appointment:  7/20/2018 for physical; plan for health check in Feb/March 2019  No results found for: CHOLEST, HDL, LDL, TRIGLY, TRIG  No results found for: EAG, A1C    Lab Results  Component Valu

## 2018-09-20 ENCOUNTER — TELEPHONE (OUTPATIENT)
Dept: FAMILY MEDICINE CLINIC | Facility: CLINIC | Age: 63
End: 2018-09-20

## 2018-09-20 DIAGNOSIS — J44.9 COPD, SEVERE (HCC): ICD-10-CM

## 2018-09-20 DIAGNOSIS — Z00.00 HEALTH CARE MAINTENANCE: Primary | ICD-10-CM

## 2018-09-20 NOTE — TELEPHONE ENCOUNTER
OTW til Friday 9/21       needs pneumonia / Flu  shots   need orders and to schedule  appt            call her on Friday

## 2018-09-21 ENCOUNTER — NURSE ONLY (OUTPATIENT)
Dept: FAMILY MEDICINE CLINIC | Facility: CLINIC | Age: 63
End: 2018-09-21

## 2018-09-21 VITALS — TEMPERATURE: 98 F

## 2018-09-21 DIAGNOSIS — Z23 NEED FOR VACCINATION: ICD-10-CM

## 2018-09-21 PROCEDURE — G0009 ADMIN PNEUMOCOCCAL VACCINE: HCPCS | Performed by: FAMILY MEDICINE

## 2018-09-21 PROCEDURE — 90653 IIV ADJUVANT VACCINE IM: CPT | Performed by: FAMILY MEDICINE

## 2018-09-21 PROCEDURE — 90670 PCV13 VACCINE IM: CPT | Performed by: FAMILY MEDICINE

## 2018-09-21 PROCEDURE — G0008 ADMIN INFLUENZA VIRUS VAC: HCPCS | Performed by: FAMILY MEDICINE

## 2018-09-21 NOTE — PROGRESS NOTES
Pt here for flu and prevnar 13 vaccinations. Prevnar 13 placed in right deltoid and flu in left deltoid. Pt tolerated both injections well.

## 2018-11-21 RX ORDER — FUROSEMIDE 80 MG
80 TABLET ORAL
Qty: 90 TABLET | Refills: 3 | Status: SHIPPED | OUTPATIENT
Start: 2018-11-21 | End: 2019-03-20

## 2018-11-21 RX ORDER — SPIRONOLACTONE 100 MG/1
100 TABLET, FILM COATED ORAL
Qty: 90 TABLET | Refills: 3 | Status: SHIPPED | OUTPATIENT
Start: 2018-11-21 | End: 2019-03-20

## 2018-11-21 RX ORDER — ALLOPURINOL 300 MG/1
300 TABLET ORAL
Qty: 90 TABLET | Refills: 3 | Status: SHIPPED | OUTPATIENT
Start: 2018-11-21 | End: 2019-03-20

## 2018-11-21 RX ORDER — CITALOPRAM 40 MG/1
40 TABLET ORAL
Qty: 90 TABLET | Refills: 3 | Status: SHIPPED | OUTPATIENT
Start: 2018-11-21 | End: 2019-03-20

## 2018-11-21 RX ORDER — LEVOTHYROXINE SODIUM 0.07 MG/1
TABLET ORAL
Qty: 90 TABLET | Refills: 3 | Status: SHIPPED | OUTPATIENT
Start: 2018-11-21 | End: 2019-03-20

## 2018-11-21 RX ORDER — GABAPENTIN 300 MG/1
CAPSULE ORAL
Qty: 180 CAPSULE | Refills: 1 | Status: SHIPPED | OUTPATIENT
Start: 2018-11-21 | End: 2019-03-20

## 2018-11-21 RX ORDER — OMEPRAZOLE 20 MG/1
CAPSULE, DELAYED RELEASE ORAL
Qty: 180 CAPSULE | Refills: 3 | Status: SHIPPED | OUTPATIENT
Start: 2018-11-21 | End: 2019-03-27

## 2018-11-21 NOTE — TELEPHONE ENCOUNTER
Please advise refills of levothyroxine, citalopram, spironolactone, furosemide, omeprazole, allopurinol, and gabapentin.      Future appt:    Last Appointment:  7/20/2018 physical; recheck in February/March  No results found for: Durene Heady, HDL, LDL, TRIGLY, Thank you very much for allowing me to evaluate Ms. Dawn. As you know she is a pleasant 27-year-old white female who for the past month or 2 has been having complaints of postprandial abdominal pain. She specifically has symptoms with coffee, she'll also have symptoms with spicy foods or tomato based products. She says the pain can be severe at times. It also radiates into the chest. She also reports reflux. There is no dysphagia, odynophagia or nausea or vomiting. She does use ibuprofen frequently and she social take up to 3 at that time. There is no melena or hematemesis. She does say the pain has been severe enough that it doubles her over. She's even had to leave work because of the pain. As you know she is now on Protonix and Carafate. I will started a couple weeks ago and there has been significant improvement in her symptoms. She unfortunately is a smoker, she is a social drinker.She has no lower GI complaints. Her bowels are regular. There is no diarrhea, constipation or bleeding. Family history is negative other than her mother has a history of irritable bowel syndrome. She's had 2 C-sections. She is in no distress, she does not look acutely ill.

## 2019-03-20 ENCOUNTER — OFFICE VISIT (OUTPATIENT)
Dept: FAMILY MEDICINE CLINIC | Facility: CLINIC | Age: 64
End: 2019-03-20
Payer: MEDICARE

## 2019-03-20 VITALS
RESPIRATION RATE: 18 BRPM | HEIGHT: 67 IN | TEMPERATURE: 99 F | SYSTOLIC BLOOD PRESSURE: 102 MMHG | WEIGHT: 315 LBS | DIASTOLIC BLOOD PRESSURE: 58 MMHG | BODY MASS INDEX: 49.44 KG/M2 | HEART RATE: 66 BPM

## 2019-03-20 DIAGNOSIS — M47.816 OSTEOARTHRITIS OF LUMBAR SPINE, UNSPECIFIED SPINAL OSTEOARTHRITIS COMPLICATION STATUS: Primary | ICD-10-CM

## 2019-03-20 DIAGNOSIS — J44.9 COPD, SEVERE (HCC): ICD-10-CM

## 2019-03-20 DIAGNOSIS — E66.01 MORBID OBESITY (HCC): ICD-10-CM

## 2019-03-20 PROCEDURE — 99214 OFFICE O/P EST MOD 30 MIN: CPT | Performed by: FAMILY MEDICINE

## 2019-03-20 RX ORDER — ALLOPURINOL 300 MG/1
300 TABLET ORAL
Qty: 90 TABLET | Refills: 3 | Status: SHIPPED | OUTPATIENT
Start: 2019-03-20 | End: 2019-07-14

## 2019-03-20 RX ORDER — FUROSEMIDE 80 MG
80 TABLET ORAL
Qty: 90 TABLET | Refills: 3 | Status: SHIPPED | OUTPATIENT
Start: 2019-03-20 | End: 2019-07-14

## 2019-03-20 RX ORDER — AMIODARONE HYDROCHLORIDE 200 MG/1
100 TABLET ORAL DAILY
Qty: 90 TABLET | Refills: 3 | Status: SHIPPED | OUTPATIENT
Start: 2019-03-20 | End: 2020-05-06

## 2019-03-20 RX ORDER — LEVOTHYROXINE SODIUM 0.07 MG/1
TABLET ORAL
Qty: 90 TABLET | Refills: 3 | Status: SHIPPED | OUTPATIENT
Start: 2019-03-20 | End: 2019-07-14

## 2019-03-20 RX ORDER — BUDESONIDE AND FORMOTEROL FUMARATE DIHYDRATE 80; 4.5 UG/1; UG/1
2 AEROSOL RESPIRATORY (INHALATION) 2 TIMES DAILY
Qty: 10.2 INHALER | Refills: 3 | Status: SHIPPED | OUTPATIENT
Start: 2019-03-20 | End: 2020-05-05

## 2019-03-20 RX ORDER — GABAPENTIN 400 MG/1
400 CAPSULE ORAL 3 TIMES DAILY
Qty: 90 CAPSULE | Refills: 3 | Status: SHIPPED | OUTPATIENT
Start: 2019-03-20 | End: 2019-08-01

## 2019-03-20 RX ORDER — SPIRONOLACTONE 100 MG/1
100 TABLET, FILM COATED ORAL
Qty: 90 TABLET | Refills: 3 | Status: SHIPPED | OUTPATIENT
Start: 2019-03-20 | End: 2019-07-14

## 2019-03-20 RX ORDER — CITALOPRAM 40 MG/1
40 TABLET ORAL
Qty: 90 TABLET | Refills: 3 | Status: SHIPPED | OUTPATIENT
Start: 2019-03-20 | End: 2019-07-14

## 2019-03-23 PROBLEM — F32.A DEPRESSION: Status: ACTIVE | Noted: 2019-03-23

## 2019-03-23 PROBLEM — E66.01 MORBID OBESITY WITH BODY MASS INDEX OF 70 AND OVER IN ADULT (HCC): Status: ACTIVE | Noted: 2017-09-11

## 2019-03-23 PROBLEM — M54.50 LOW BACK PAIN: Status: ACTIVE | Noted: 2019-03-23

## 2019-03-23 NOTE — PROGRESS NOTES
Chief Complaint:   Patient presents with:  Low Back Pain: started three days ago, no injury      HPI:   This is a 70year old male coming in for low back pain   Increase in pain this week ; long history of back pain.      No prior injury     Some radiation required):      disabled for obesity;    medically retired from [employer]. moved here from [place] for the past 2o years, and then [facility] for 10 years.   [place] 1 [place]              Current Meds:    Current Outpatient  Denies , fever, chills, or fatigue,     EENT:  Eyes:  Denies eye pain, visual changes,   Ears, Nose, Throat:  Denies hearing loss,or disturbance.  Denies sore throat  INTEGUMENTARY:  Denies rashes, itching.     CARDIOVASCULAR: Denies  chest discomfort, pal hepatosplenomegaly. : deferred by patient     BACK: dec ROM , mild tenderness on right lumbar region,   No muscle spasm     EXTREMITIES: left shoulder: marked crepitance, dec ROM       No edema, no cyanosis,FROM, 2+ dorsalis pedis pulses bilaterally.   Tamra Bernstein complications, allergies, or worsening or changing symptoms. Patient is to call with any side effects or complications from the treatments as a result of today.      Problem List:  Patient Active Problem List:     Morbid obesity with body mass index of 70

## 2019-03-27 NOTE — TELEPHONE ENCOUNTER
Please advise refills of omeprazole and naproxen. Patient has switched insurance and now has to use [facility] mail order pharmacy.      Future appt:    Last Appointment:  3/20/2019 back pain; recheck physical in July  No results found for: CHOLEST, HDL, LDL, TR

## 2019-03-28 RX ORDER — NAPROXEN 500 MG/1
TABLET ORAL
Qty: 180 TABLET | Refills: 0 | Status: SHIPPED | OUTPATIENT
Start: 2019-03-28 | End: 2019-07-14

## 2019-03-28 RX ORDER — OMEPRAZOLE 20 MG/1
20 CAPSULE, DELAYED RELEASE ORAL 2 TIMES DAILY
Qty: 180 CAPSULE | Refills: 0 | Status: SHIPPED | OUTPATIENT
Start: 2019-03-28 | End: 2019-04-12

## 2019-04-12 ENCOUNTER — TELEPHONE (OUTPATIENT)
Dept: FAMILY MEDICINE CLINIC | Facility: CLINIC | Age: 64
End: 2019-04-12

## 2019-04-12 NOTE — TELEPHONE ENCOUNTER
is needing RF of ameprozole to [facility]  ( should be on file)  Due to new Insuance coverage / pharmacy . .    pharmacy needs further info from provider  ( this has gone unanswered )

## 2019-04-12 NOTE — TELEPHONE ENCOUNTER
Patient's wife Bernarda Mcneill is calling stating that [id number] [facility] has been trying to contact us. Bernarda Herreranch had no information as to why. Bernarda Osito was instructed that she needs to find out what they need from us and then let us know.      Bernardanelson Mcneill verbalized

## 2019-04-13 NOTE — TELEPHONE ENCOUNTER
Please see note below and advise. Updated a note to the pharmacy in script stating medication cannot be substituted.      Future appt:    Last Appointment:  3/20/2019 f/u; recheck in July  No results found for: CHOLEST, HDL, LDL, TRIGLY, TRIG  No results fo

## 2019-04-16 RX ORDER — OMEPRAZOLE 20 MG/1
20 CAPSULE, DELAYED RELEASE ORAL 2 TIMES DAILY
Qty: 180 CAPSULE | Refills: 0 | Status: SHIPPED | OUTPATIENT
Start: 2019-04-16 | End: 2019-10-02

## 2019-04-16 NOTE — TELEPHONE ENCOUNTER
Called patient back to clarify why patient is taking omeprazole DR twice daily and if he has had any further testing done such as a scope. Patient's wife Raven Walters states patient takes it twice daily for his chronic indigestion.  Patient used to take it o

## 2019-07-15 NOTE — TELEPHONE ENCOUNTER
Received my chart refill request from patient. Patient was last seen March 2019 for back pain and instructed to follow up in July for his physical.     Patient has not scheduled. [employer] message sent to patient asking him to schedule medicare annual as Dr. Yaneth Tadeo is currently booking into late August/ early September. Please advise refills. Future appt:    Last Appointment:  3/20/2019 back pain  No results found for: CHOLEST, HDL, LDL, TRIGLY, TRIG  No results found for: EAG, A1C  Lab Results   Component Value Date    TSH 0.244 (L) 12/14/2017       No follow-ups on file. Last RF: 3/20/19 to [facility].  Patient wants them sent to [facility] mail order now

## 2019-07-16 NOTE — TELEPHONE ENCOUNTER
Patient's wife Emilee Sethi informed of the below and scheduled medicare annual for Vineet Leal. Please advise refills to [facility] order. Emilee Sethi is also asking if Dr. Padmini Morales can send a prescription to [facility] for tramadol to help with patient's pain. Emilee Sethi states they have done everything Dr. Ivan Hook advised they do and Vineet Leal is still in pain. Please advise.

## 2019-07-17 RX ORDER — NAPROXEN 500 MG/1
TABLET ORAL
Qty: 180 TABLET | Refills: 0 | Status: SHIPPED | OUTPATIENT
Start: 2019-07-17 | End: 2019-10-02

## 2019-07-17 RX ORDER — FUROSEMIDE 80 MG
80 TABLET ORAL
Qty: 90 TABLET | Refills: 0 | Status: SHIPPED | OUTPATIENT
Start: 2019-07-17 | End: 2019-10-09

## 2019-07-17 RX ORDER — ALLOPURINOL 300 MG/1
300 TABLET ORAL
Qty: 90 TABLET | Refills: 0 | Status: SHIPPED | OUTPATIENT
Start: 2019-07-17 | End: 2019-10-02

## 2019-07-17 RX ORDER — LEVOTHYROXINE SODIUM 0.07 MG/1
TABLET ORAL
Qty: 90 TABLET | Refills: 0 | Status: SHIPPED | OUTPATIENT
Start: 2019-07-17 | End: 2019-10-02

## 2019-07-17 RX ORDER — CITALOPRAM 40 MG/1
40 TABLET ORAL
Qty: 90 TABLET | Refills: 0 | Status: SHIPPED | OUTPATIENT
Start: 2019-07-17 | End: 2019-10-02

## 2019-07-17 RX ORDER — SPIRONOLACTONE 100 MG/1
100 TABLET, FILM COATED ORAL
Qty: 90 TABLET | Refills: 0 | Status: SHIPPED | OUTPATIENT
Start: 2019-07-17 | End: 2019-10-02

## 2019-07-22 NOTE — TELEPHONE ENCOUNTER
Refills were already sent in on 07/17/2019 - why is there another request ?? I do not recommend Tramadol for long term pain - recommend appointment with pain clinic.

## 2019-08-01 DIAGNOSIS — M47.816 OSTEOARTHRITIS OF LUMBAR SPINE, UNSPECIFIED SPINAL OSTEOARTHRITIS COMPLICATION STATUS: ICD-10-CM

## 2019-08-01 DIAGNOSIS — G89.4 CHRONIC PAIN SYNDROME: Primary | ICD-10-CM

## 2019-08-01 RX ORDER — GABAPENTIN 400 MG/1
400 CAPSULE ORAL 3 TIMES DAILY
Qty: 90 CAPSULE | Refills: 0 | Status: SHIPPED | OUTPATIENT
Start: 2019-08-01 | End: 2019-09-09

## 2019-08-01 NOTE — TELEPHONE ENCOUNTER
One refill provided, sees Dr. Caron Barcenas 08/28/19 for Physical.    Will sign, please fax script.

## 2019-08-01 NOTE — TELEPHONE ENCOUNTER
Received fax from [facility] requesting interim supply refill until scheduled appointment. Future appt:     Your appointments     Date & Time Appointment Department [facility])    Aug 28, 2019 11:00 AM CDT Medicare Annual Well Visit with Anna Veloz MD E

## 2019-09-09 DIAGNOSIS — M47.816 OSTEOARTHRITIS OF LUMBAR SPINE, UNSPECIFIED SPINAL OSTEOARTHRITIS COMPLICATION STATUS: ICD-10-CM

## 2019-09-09 RX ORDER — GABAPENTIN 400 MG/1
400 CAPSULE ORAL 3 TIMES DAILY
Qty: 90 CAPSULE | Refills: 0 | Status: SHIPPED | OUTPATIENT
Start: 2019-09-09 | End: 2019-10-08

## 2019-09-09 NOTE — TELEPHONE ENCOUNTER
Future appt:     Your appointments     Date & Time Appointment Department [facility])    Sep 18, 2019  4:00 PM CDT Medicare Annual Well Visit with Meenu Melendez MD [address] [place] [address]            Obie Valdez

## 2019-10-03 NOTE — TELEPHONE ENCOUNTER
Future appt:     Your appointments     Date & Time Appointment Department [facility])    Oct 11, 2019  2:45 PM CDT Medicare Annual Well Visit with Will Rai MD [address] [place] [address], [place] ([facility])            Akila Kauffman

## 2019-10-04 RX ORDER — OMEPRAZOLE 20 MG/1
CAPSULE, DELAYED RELEASE ORAL
Qty: 180 CAPSULE | Refills: 0 | Status: SHIPPED | OUTPATIENT
Start: 2019-10-04 | End: 2019-12-25

## 2019-10-04 RX ORDER — SPIRONOLACTONE 100 MG/1
TABLET, FILM COATED ORAL
Qty: 90 TABLET | Refills: 0 | Status: SHIPPED | OUTPATIENT
Start: 2019-10-04 | End: 2019-12-25

## 2019-10-04 RX ORDER — LEVOTHYROXINE SODIUM 0.07 MG/1
TABLET ORAL
Qty: 90 TABLET | Refills: 0 | Status: SHIPPED | OUTPATIENT
Start: 2019-10-04 | End: 2019-10-11 | Stop reason: ALTCHOICE

## 2019-10-04 RX ORDER — CITALOPRAM 40 MG/1
TABLET ORAL
Qty: 90 TABLET | Refills: 0 | Status: SHIPPED | OUTPATIENT
Start: 2019-10-04 | End: 2019-12-25

## 2019-10-04 RX ORDER — NAPROXEN 500 MG/1
TABLET ORAL
Qty: 180 TABLET | Refills: 0 | Status: SHIPPED | OUTPATIENT
Start: 2019-10-04 | End: 2019-12-25

## 2019-10-04 RX ORDER — ALLOPURINOL 300 MG/1
TABLET ORAL
Qty: 90 TABLET | Refills: 0 | Status: SHIPPED | OUTPATIENT
Start: 2019-10-04 | End: 2019-12-25

## 2019-10-08 DIAGNOSIS — M47.816 OSTEOARTHRITIS OF LUMBAR SPINE, UNSPECIFIED SPINAL OSTEOARTHRITIS COMPLICATION STATUS: ICD-10-CM

## 2019-10-08 NOTE — TELEPHONE ENCOUNTER
Future appt:     Your appointments     Date & Time Appointment Department [facility])    Oct 11, 2019  2:45 PM CDT Medicare Annual Well Visit with Melany Britt MD [address] [place] [address] [place], [place] ([place])            Eva Gasca

## 2019-10-09 RX ORDER — GABAPENTIN 400 MG/1
400 CAPSULE ORAL 3 TIMES DAILY
Qty: 90 CAPSULE | Refills: 0 | Status: SHIPPED | OUTPATIENT
Start: 2019-10-09 | End: 2019-11-21

## 2019-10-10 RX ORDER — FUROSEMIDE 80 MG
80 TABLET ORAL
Qty: 90 TABLET | Refills: 0 | Status: SHIPPED | OUTPATIENT
Start: 2019-10-10 | End: 2019-12-25

## 2019-10-10 NOTE — TELEPHONE ENCOUNTER
Future appt:     Your appointments     Date & Time Appointment Department [facility])    Oct 11, 2019  2:45 PM CDT Medicare Annual Well Visit with Mey Acosta MD [address] [place] [address]            Deandre French

## 2019-10-11 ENCOUNTER — OFFICE VISIT (OUTPATIENT)
Dept: FAMILY MEDICINE CLINIC | Facility: CLINIC | Age: 64
End: 2019-10-11
Payer: MEDICARE

## 2019-10-11 VITALS
TEMPERATURE: 98 F | SYSTOLIC BLOOD PRESSURE: 134 MMHG | OXYGEN SATURATION: 95 % | DIASTOLIC BLOOD PRESSURE: 62 MMHG | HEIGHT: 67 IN | BODY MASS INDEX: 49.44 KG/M2 | RESPIRATION RATE: 18 BRPM | WEIGHT: 315 LBS | HEART RATE: 66 BPM

## 2019-10-11 DIAGNOSIS — E03.9 ACQUIRED HYPOTHYROIDISM: ICD-10-CM

## 2019-10-11 DIAGNOSIS — I10 BENIGN HYPERTENSION: ICD-10-CM

## 2019-10-11 DIAGNOSIS — Z00.00 ENCOUNTER FOR ANNUAL HEALTH EXAMINATION: Primary | ICD-10-CM

## 2019-10-11 DIAGNOSIS — N18.30 CHRONIC RENAL INSUFFICIENCY, STAGE 3 (MODERATE) (HCC): ICD-10-CM

## 2019-10-11 DIAGNOSIS — F33.41 RECURRENT MAJOR DEPRESSIVE DISORDER, IN PARTIAL REMISSION (HCC): ICD-10-CM

## 2019-10-11 PROCEDURE — G0439 PPPS, SUBSEQ VISIT: HCPCS | Performed by: FAMILY MEDICINE

## 2019-10-11 PROCEDURE — 96160 PT-FOCUSED HLTH RISK ASSMT: CPT | Performed by: FAMILY MEDICINE

## 2019-10-11 PROCEDURE — 99397 PER PM REEVAL EST PAT 65+ YR: CPT | Performed by: FAMILY MEDICINE

## 2019-10-11 RX ORDER — MELATONIN 10 MG
1 CAPSULE ORAL NIGHTLY
COMMUNITY

## 2019-10-12 NOTE — PROGRESS NOTES
Chief Complaint:   Patient presents with: Well Adult      HPI:   This is a 75year old male coming in for healthcare check. I discussed with him heart disease prevention, cancer early detection and immunizations.     Immunizations: Patient up-to-date with History (reviewed - no changes required):              disabled for obesity;        medically retired from [employer]. moved here from [address] [place] [address] [employer] for the past 2o years, and then [facility] for 10 years.       has 1 daughter lives in [place] changes,   Ears, Nose, Throat:  Denies hearing loss,or disturbance.  Denies sore throat  INTEGUMENTARY:  Denies rashes, itching.     CARDIOVASCULAR: Denies  chest discomfort, palpitations, edema,  RESPIRATORY:  Denies shortness of breath, wheezing, cough or patient     BACK: No tenderness, no spasm, SLR test negative, FROM. EXTREMITIES: left shoulder: marked crepitance, dec ROM       No edema, no cyanosis,FROM, 2+ dorsalis pedis pulses bilaterally.   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

## 2019-10-25 ENCOUNTER — TELEPHONE (OUTPATIENT)
Dept: FAMILY MEDICINE CLINIC | Facility: CLINIC | Age: 64
End: 2019-10-25

## 2019-10-25 NOTE — TELEPHONE ENCOUNTER
----- Message from Jaclyn Lopez sent at 10/25/2019  3:55 PM CDT -----  [phone]     [facility]         Jaclyn Lopez, 10/25/19, 3:56 PM

## 2019-10-25 NOTE — TELEPHONE ENCOUNTER
Received cologuard report. Negative result noted. HM updated. Message left for patient to call the office back.

## 2019-11-05 ENCOUNTER — TELEPHONE (OUTPATIENT)
Dept: FAMILY MEDICINE CLINIC | Facility: CLINIC | Age: 64
End: 2019-11-05

## 2019-11-05 NOTE — TELEPHONE ENCOUNTER
----- Message from Satya Oates MD sent at 11/5/2019 11:51 AM CST -----  Labs reviewed     CBC normal     Chem profile - BS - normal   Kidney function - creatinine level 1.54 - elevated , but improved c/w last 2 years.    Continue with healthy nutrition

## 2019-11-21 DIAGNOSIS — M47.816 OSTEOARTHRITIS OF LUMBAR SPINE, UNSPECIFIED SPINAL OSTEOARTHRITIS COMPLICATION STATUS: ICD-10-CM

## 2019-11-21 RX ORDER — GABAPENTIN 400 MG/1
CAPSULE ORAL
Qty: 90 CAPSULE | Refills: 0 | OUTPATIENT
Start: 2019-11-21

## 2019-11-21 RX ORDER — GABAPENTIN 400 MG/1
400 CAPSULE ORAL 3 TIMES DAILY
Qty: 90 CAPSULE | Refills: 0 | Status: SHIPPED | OUTPATIENT
Start: 2019-11-21 | End: 2019-12-30

## 2019-11-21 NOTE — TELEPHONE ENCOUNTER
[facility] called stating that they sent a refill request for Gabapentin 400mg tid on 11/9 and have not received a response. I reviewed chart and do not see where we received a request. Pt is in the store now and not happy.  She is asking if someone else can d

## 2019-12-23 NOTE — TELEPHONE ENCOUNTER
Future appt:    Last Appointment with provider:   10/11/2019 physical; recheck 3 months  Last appointment at [facility]:  10/11/2019  CHOLESTEROL, TOTAL (mg/dL)   Date Value   11/04/2019 126     HDL CHOLESTEROL (mg/dL)   Date Value   11/04/2019 42     LD

## 2019-12-25 RX ORDER — FUROSEMIDE 80 MG
TABLET ORAL
Qty: 90 TABLET | Refills: 0 | Status: SHIPPED | OUTPATIENT
Start: 2019-12-25 | End: 2020-04-02

## 2019-12-25 RX ORDER — OMEPRAZOLE 20 MG/1
CAPSULE, DELAYED RELEASE ORAL
Qty: 180 CAPSULE | Refills: 0 | Status: SHIPPED | OUTPATIENT
Start: 2019-12-25 | End: 2020-03-13

## 2019-12-25 RX ORDER — LEVOTHYROXINE SODIUM 0.07 MG/1
TABLET ORAL
Qty: 90 TABLET | Refills: 0 | Status: SHIPPED | OUTPATIENT
Start: 2019-12-25 | End: 2020-04-02

## 2019-12-25 RX ORDER — ALLOPURINOL 300 MG/1
TABLET ORAL
Qty: 90 TABLET | Refills: 0 | Status: SHIPPED | OUTPATIENT
Start: 2019-12-25 | End: 2020-04-02

## 2019-12-25 RX ORDER — CITALOPRAM 40 MG/1
TABLET ORAL
Qty: 90 TABLET | Refills: 0 | Status: SHIPPED | OUTPATIENT
Start: 2019-12-25 | End: 2020-04-02

## 2019-12-25 RX ORDER — SPIRONOLACTONE 100 MG/1
TABLET, FILM COATED ORAL
Qty: 90 TABLET | Refills: 0 | Status: SHIPPED | OUTPATIENT
Start: 2019-12-25 | End: 2020-04-02

## 2019-12-25 RX ORDER — NAPROXEN 500 MG/1
TABLET ORAL
Qty: 180 TABLET | Refills: 0 | Status: SHIPPED | OUTPATIENT
Start: 2019-12-25 | End: 2020-03-13

## 2019-12-30 DIAGNOSIS — M47.816 OSTEOARTHRITIS OF LUMBAR SPINE, UNSPECIFIED SPINAL OSTEOARTHRITIS COMPLICATION STATUS: ICD-10-CM

## 2019-12-30 RX ORDER — GABAPENTIN 400 MG/1
400 CAPSULE ORAL 3 TIMES DAILY
Qty: 90 CAPSULE | Refills: 0 | Status: SHIPPED | OUTPATIENT
Start: 2019-12-30 | End: 2020-02-04

## 2019-12-30 NOTE — TELEPHONE ENCOUNTER
Dr. Evangelina To is out of office today. Please let patient know a refill was done, but that he is due for a follow-up with Dr. Evangelina To next month. Thank you.

## 2020-01-11 ENCOUNTER — TELEPHONE (OUTPATIENT)
Dept: FAMILY MEDICINE CLINIC | Facility: CLINIC | Age: 65
End: 2020-01-11

## 2020-01-11 NOTE — TELEPHONE ENCOUNTER
Spoke with Patient's wife Brissa Campos and scheduled the patient for Sleep FU on 1/16/20 with [facility]. Patient's wife verbalized understanding and has no further questions or concerns.

## 2020-01-21 ENCOUNTER — TELEPHONE (OUTPATIENT)
Dept: FAMILY MEDICINE CLINIC | Facility: CLINIC | Age: 65
End: 2020-01-21

## 2020-01-21 NOTE — TELEPHONE ENCOUNTER
Rescheduled patient to see [facility] on 2/3/20 for Sleep FU. Patient's wife verbalized understanding and has no further questions or concerns.

## 2020-02-03 DIAGNOSIS — M47.816 OSTEOARTHRITIS OF LUMBAR SPINE, UNSPECIFIED SPINAL OSTEOARTHRITIS COMPLICATION STATUS: ICD-10-CM

## 2020-02-03 NOTE — TELEPHONE ENCOUNTER
Future appt:    Last Appointment with provider:   10/11/2019;Recheck with me in 3 months.       Last appointment at [facility]:  10/11/2019  CHOLESTEROL, TOTAL (mg/dL)   Date Value   11/04/2019 126     HDL CHOLESTEROL (mg/dL)   Date Value   11/04/2019 42

## 2020-02-04 RX ORDER — GABAPENTIN 400 MG/1
400 CAPSULE ORAL 3 TIMES DAILY
Qty: 90 CAPSULE | Refills: 0 | Status: SHIPPED | OUTPATIENT
Start: 2020-02-04 | End: 2020-03-12

## 2020-02-04 NOTE — TELEPHONE ENCOUNTER
Patient's wife Brenda Orozco informed of the below and scheduled patient for f/u appt with Dr. Real Gutiérrez.      Future Appointments   Date Time Provider Shyam Fuentes   2/25/2020  2:00 PM Rochelle Roe MD [facility] [place] [facility]

## 2020-03-12 DIAGNOSIS — M47.816 OSTEOARTHRITIS OF LUMBAR SPINE, UNSPECIFIED SPINAL OSTEOARTHRITIS COMPLICATION STATUS: ICD-10-CM

## 2020-03-12 RX ORDER — GABAPENTIN 400 MG/1
400 CAPSULE ORAL 3 TIMES DAILY
Qty: 90 CAPSULE | Refills: 0 | Status: SHIPPED | OUTPATIENT
Start: 2020-03-12 | End: 2020-04-27

## 2020-03-13 RX ORDER — NAPROXEN 500 MG/1
TABLET ORAL
Qty: 180 TABLET | Refills: 0 | Status: SHIPPED | OUTPATIENT
Start: 2020-03-13 | End: 2020-07-01

## 2020-03-13 RX ORDER — OMEPRAZOLE 20 MG/1
CAPSULE, DELAYED RELEASE ORAL
Qty: 180 CAPSULE | Refills: 0 | Status: SHIPPED | OUTPATIENT
Start: 2020-03-13 | End: 2020-05-24

## 2020-03-13 NOTE — TELEPHONE ENCOUNTER
Future appt:    Last Appointment with provider:   10/11/2019 physical; recheck 3 months  Last appointment at [facility]:  10/11/2019  CHOLESTEROL, TOTAL (mg/dL)   Date Value   11/04/2019 126     HDL CHOLESTEROL (mg/dL)   Date Value   11/04/2019 42     LD

## 2020-03-17 ENCOUNTER — TELEPHONE (OUTPATIENT)
Dept: FAMILY MEDICINE CLINIC | Facility: CLINIC | Age: 65
End: 2020-03-17

## 2020-03-17 NOTE — TELEPHONE ENCOUNTER
Received fax from [facility] that patient's omeprazole DR prescription written for twice daily dosing is over the quantity limit for patient's plan. Plan only covers one capsule daily.      Complete PA and was given approval until 12/31/2020 this year

## 2020-04-02 RX ORDER — ALLOPURINOL 300 MG/1
TABLET ORAL
Qty: 90 TABLET | Refills: 0 | Status: SHIPPED | OUTPATIENT
Start: 2020-04-02 | End: 2020-07-01

## 2020-04-02 RX ORDER — LEVOTHYROXINE SODIUM 0.07 MG/1
TABLET ORAL
Qty: 90 TABLET | Refills: 0 | Status: SHIPPED | OUTPATIENT
Start: 2020-04-02 | End: 2020-07-01

## 2020-04-02 RX ORDER — SPIRONOLACTONE 100 MG/1
TABLET, FILM COATED ORAL
Qty: 90 TABLET | Refills: 0 | Status: SHIPPED | OUTPATIENT
Start: 2020-04-02 | End: 2020-07-01

## 2020-04-02 RX ORDER — CITALOPRAM 40 MG/1
TABLET ORAL
Qty: 90 TABLET | Refills: 0 | Status: SHIPPED | OUTPATIENT
Start: 2020-04-02 | End: 2020-07-01

## 2020-04-02 RX ORDER — FUROSEMIDE 80 MG
TABLET ORAL
Qty: 90 TABLET | Refills: 0 | Status: SHIPPED | OUTPATIENT
Start: 2020-04-02 | End: 2020-07-01

## 2020-04-02 NOTE — TELEPHONE ENCOUNTER
Future appt:    Last Appointment with provider:   10/11/2019  Last appointment at [facility]:  10/11/2019  CHOLESTEROL, TOTAL (mg/dL)   Date Value   11/04/2019 126     HDL CHOLESTEROL (mg/dL)   Date Value   11/04/2019 42     LDL-CHOLESTEROL (mg/dL (calc)

## 2020-04-06 RX ORDER — OMEPRAZOLE 20 MG/1
CAPSULE, DELAYED RELEASE ORAL
Qty: 60 CAPSULE | Refills: 0 | OUTPATIENT
Start: 2020-04-06

## 2020-04-21 ENCOUNTER — PATIENT MESSAGE (OUTPATIENT)
Dept: FAMILY MEDICINE CLINIC | Facility: CLINIC | Age: 65
End: 2020-04-21

## 2020-04-21 NOTE — TELEPHONE ENCOUNTER
From: Krupa Tony  To: Roger Mendoza MD  Sent: 4/21/2020 9:56 AM CDT  Subject: Prescription Question    Due to Covid-19 I am reluctant to make an appointment, however I'm in need of new mask for my cpap.  If possible would you call a prescription for the

## 2020-04-27 DIAGNOSIS — M47.816 OSTEOARTHRITIS OF LUMBAR SPINE, UNSPECIFIED SPINAL OSTEOARTHRITIS COMPLICATION STATUS: ICD-10-CM

## 2020-04-28 ENCOUNTER — TELEPHONE (OUTPATIENT)
Dept: FAMILY MEDICINE CLINIC | Facility: CLINIC | Age: 65
End: 2020-04-28

## 2020-04-28 RX ORDER — GABAPENTIN 400 MG/1
400 CAPSULE ORAL 3 TIMES DAILY
Qty: 90 CAPSULE | Refills: 1 | OUTPATIENT
Start: 2020-04-28

## 2020-04-28 NOTE — TELEPHONE ENCOUNTER
Please advise if patient should be scheduled for phone or video visit? Future appt:    Last Appointment with provider: 10/11/2019 physical; recheck in 3 months  Last appointment at [facility]:  Visit date not found  CHOLESTEROL, TOTAL (mg/dL)   Date Value   11/04/2019 126     HDL CHOLESTEROL (mg/dL)   Date Value   11/04/2019 42     LDL-CHOLESTEROL (mg/dL (calc))   Date Value   11/04/2019 68     TRIGLYCERIDES (mg/dL)   Date Value   11/04/2019 84     No results found for: EAG, A1C  Lab Results   Component Value Date    TSH 0.244 (L) 12/14/2017    TSHT4 0.92 11/04/2019       No follow-ups on file.

## 2020-04-28 NOTE — TELEPHONE ENCOUNTER
See mychart refill encounter. This is a duplicate request. Denied the e-scribe as refill can be sent from the other encounter without giving patient a notice of denial via [employer].

## 2020-04-28 NOTE — TELEPHONE ENCOUNTER
needs appt via phonevisit ( wife is [place] pt and they do not have Video on computer )     needs RF of Gabapentin     consent given by Sweetie Pacheco to have Phone visit

## 2020-04-28 NOTE — TELEPHONE ENCOUNTER
----- Message from Andrew Marques sent at 4/28/2020  4:04 PM CDT -----  Regarding: rc  And can be reached back at [phone].  Thank you

## 2020-04-28 NOTE — TELEPHONE ENCOUNTER
Please see other refill encounter from yesterday. Gabapentin pended in that encounter. Please advise scheduling recommendations.      Patient consented to a phone visit with Clay Marques.

## 2020-04-28 NOTE — TELEPHONE ENCOUNTER
Left message for patient's wife Susie Starr to call the office back. Per Dr. Humza Terry patient to be scheduled for phone visit tomorrow morning preferably.

## 2020-04-28 NOTE — TELEPHONE ENCOUNTER
Spoke with patient's wife Sasha Franco. States patient doesn't drive and she has cancer and was informed not to go near any doctor's offices.       Wife will have her niece bring in patient's chip this Thursday, 4/30/2020, in the afternoon to have it downloade

## 2020-04-28 NOTE — TELEPHONE ENCOUNTER
Patient was due for an appt in January. Future appt:    Last Appointment with provider:   10/11/2019; Recheck with me in 3 months.      Last appointment at [facility]:  Visit date not found  CHOLESTEROL, TOTAL (mg/dL)   Date Value   11/04/2019 126

## 2020-04-28 NOTE — TELEPHONE ENCOUNTER
Patient is not current in Care . He will need to come to the office with his SD card on a day that Dr. Flako Coello or myself are in the office.

## 2020-04-28 NOTE — TELEPHONE ENCOUNTER
Ewa Robb  verbally consents to a Virtual/Telephone Check-In service on 5/4/2020. Patient understands and accepts financial responsibility for any deductible, co-insurance and/or co-pays associated with this service.     set up MALA f/up on 5/4 @ 1pm

## 2020-04-29 ENCOUNTER — VIRTUAL PHONE E/M (OUTPATIENT)
Dept: FAMILY MEDICINE CLINIC | Facility: CLINIC | Age: 65
End: 2020-04-29

## 2020-04-29 DIAGNOSIS — M47.816 OSTEOARTHRITIS OF LUMBAR SPINE, UNSPECIFIED SPINAL OSTEOARTHRITIS COMPLICATION STATUS: Primary | ICD-10-CM

## 2020-04-29 DIAGNOSIS — E66.01 MORBID OBESITY (HCC): ICD-10-CM

## 2020-04-29 DIAGNOSIS — J44.9 COPD, SEVERE (HCC): ICD-10-CM

## 2020-04-29 DIAGNOSIS — I10 BENIGN HYPERTENSION: ICD-10-CM

## 2020-04-29 DIAGNOSIS — G89.4 CHRONIC PAIN SYNDROME: ICD-10-CM

## 2020-04-29 PROCEDURE — 99443 PHONE E/M BY PHYS 21-30 MIN: CPT | Performed by: FAMILY MEDICINE

## 2020-04-29 RX ORDER — GABAPENTIN 400 MG/1
400 CAPSULE ORAL 3 TIMES DAILY
Qty: 270 CAPSULE | Refills: 3 | Status: SHIPPED | OUTPATIENT
Start: 2020-04-29 | End: 2020-09-24

## 2020-04-29 NOTE — PROGRESS NOTES
Chief Complaint:   Patient presents with: Follow - Up: back pain      Phone visit done in [facility] of corona virus pandemic emergency. Phone consent done. HPI:   This is a 75year old male follow up care.    Patient needing refills  - patient last se REFLEX TO FREE T4   Result Value Ref Range    TSH W/REFLEX TO FT4 0.92 0.40 - 4.50 mIU/L   LIPID PANEL   Result Value Ref Range    CHOLESTEROL, TOTAL 126 <200 mg/dL    HDL CHOLESTEROL 42 >40 mg/dL    TRIGLYCERIDES 84 <150 mg/dL    LDL-CHOLESTEROL 68 mg/dL children: Not on file      Years of education: Not on file      Highest education level: Not on file    Tobacco Use      Smoking status: Former Smoker        Years: 16.00        Types: Cigarettes      Smokeless tobacco: Never Used    Substance and Sexual A Tab Take 0.5 tablets (100 mg total) by mouth daily. 90 tablet 3   • Budesonide-Formoterol Fumarate (SYMBICORT) 80-4.5 MCG/ACT Inhalation Aerosol Inhale 2 puffs into the lungs 2 (two) times daily. Rinse mouth after use.  10.2 Inhaler 3   • Levothyroxine Sodi cough during visit, no respiratory distress. PSYCH: no depression or anxiety noted. ASSESSMENT AND PLAN:   1. Osteoarthritis of lumbar spine, unspecified spinal osteoarthritis complication status  3.  Chronic pain syndrome  Continue with current MD  4/29/2020  1:53 PM      Time spent : 25 minutes.

## 2020-04-29 NOTE — TELEPHONE ENCOUNTER
Patient was scheduled for phone visit this afternoon with Dr. Junior Reece.      Future Appointments   Date Time Provider [facility]   4/29/2020  1:30 PM Christian Hahn MD [facility] [place] [facility] [place]   5/4/2020  1:00 PM Paris Beaver APRN [facility] [place] [facility]

## 2020-04-30 VITALS — DIASTOLIC BLOOD PRESSURE: 78 MMHG | HEART RATE: 78 BPM | SYSTOLIC BLOOD PRESSURE: 136 MMHG

## 2020-05-04 ENCOUNTER — VIRTUAL PHONE E/M (OUTPATIENT)
Dept: FAMILY MEDICINE CLINIC | Facility: CLINIC | Age: 65
End: 2020-05-04
Payer: MEDICARE

## 2020-05-04 VITALS — BODY MASS INDEX: 60 KG/M2 | WEIGHT: 315 LBS

## 2020-05-04 DIAGNOSIS — G47.33 OSA (OBSTRUCTIVE SLEEP APNEA): Primary | ICD-10-CM

## 2020-05-04 PROCEDURE — 99442 PHONE E/M BY PHYS 11-20 MIN: CPT | Performed by: NURSE PRACTITIONER

## 2020-05-04 NOTE — TELEPHONE ENCOUNTER
Future appt:    Last Appointment with provider: 10/11/2019      Last appointment at [facility]:  Visit date not found  CHOLESTEROL, TOTAL (mg/dL)   Date Value   11/04/2019 126     HDL CHOLESTEROL (mg/dL)   Date Value   11/04/2019 42     LDL-CHOLESTEROL (

## 2020-05-04 NOTE — PROGRESS NOTES
[address] [place] [address]  SLEEP PROGRESS NOTE    Brandie Morgan  verbally consents to a Virtual/Telephone Check-In service on 5/4/2020.     Patient understands and accepts financial responsibility for any deductible, co-insurance and/or co-pays associat ([facility] 12/20/2017   • COPD (chronic obstructive pulmonary disease) (HCC)    • COPD, severe ([facility] 3/8/2016   • Depression    • Hypothyroidism    • Iron deficiency anemia 12/13/2013   • Obesity    • Osteoarthrosis 7/10/2013   • Primary osteoarthritis of left LEVOTHYROXINE SODIUM 75 MCG Oral Tab TAKE 1 TABLET EVERY MORNINGBEFORE BREAKFAST 90 tablet 0   • FUROSEMIDE 80 MG Oral Tab TAKE 1 TABLET ONCE DAILY 90 tablet 0   • SPIRONOLACTONE 100 MG Oral Tab TAKE 1 TABLET DAILY 90 tablet 0   • ALLOPURINOL 300 MG Oral T 3 (moderate) (HCC)     Raynaud's phenomenon without gangrene     Major depression in partial remission (HCC)     Low back pain     HTN (hypertension)     GERD (gastroesophageal reflux disease)     Abdominal pannus     Asthma     Depression     Former smoke has a  7 fold increase in risk of heart attack, stroke, abnormal heart rhythm  and death,  increased risk of driving accidents. Advised to refrain from driving when sleepy.     COMPLIANCE is required by insurance for 4 hours a night most nights of the wee

## 2020-05-04 NOTE — PATIENT INSTRUCTIONS
Order for new machine and supplies to [facility]. Continue sleep therapy. Follow-up in 31 - 45 days on the new machine - sooner if needed.      Advised if still with sleep apnea and not using CPAP has a  7 fold increase in risk of heart attack, stroke, abn

## 2020-05-05 ENCOUNTER — TELEPHONE (OUTPATIENT)
Dept: FAMILY MEDICINE CLINIC | Facility: CLINIC | Age: 65
End: 2020-05-05

## 2020-05-05 RX ORDER — BUDESONIDE AND FORMOTEROL FUMARATE DIHYDRATE 80; 4.5 UG/1; UG/1
2 AEROSOL RESPIRATORY (INHALATION) 2 TIMES DAILY
Qty: 3 INHALER | Refills: 3 | Status: SHIPPED | OUTPATIENT
Start: 2020-05-05 | End: 2021-08-25

## 2020-05-05 NOTE — TELEPHONE ENCOUNTER
Future appt:    Last Appointment with provider:   Visit date not found  Last appointment at [facility]:  Visit date not found  CHOLESTEROL, TOTAL (mg/dL)   Date Value   11/04/2019 126     HDL CHOLESTEROL (mg/dL)   Date Value   11/04/2019 42     LDL-CHOLESTEROL (mg/dL (calc))   Date Value   11/04/2019 68     TRIGLYCERIDES (mg/dL)   Date Value   11/04/2019 84     No results found for: EAG, A1C  Lab Results   Component Value Date    TSH 0.244 (L) 12/14/2017    TSHT4 0.92 11/04/2019       No follow-ups on file. Recheck with me in 3 months.

## 2020-05-05 NOTE — TELEPHONE ENCOUNTER
Future appt:    Last Appointment with provider:   Visit date not found  Last appointment at [facility]:  Visit date not found  Last px:  10/11/19-  Pt was due for recheck in 3months  Pt had Video visit on 4/29/20      Symbicort refilled on 3/20/19-  #102

## 2020-05-05 NOTE — TELEPHONE ENCOUNTER
RF Symacort inhaler    (  they do home deliveries 1x wkly which is today @ 930am )   Please advise ASAP

## 2020-05-06 RX ORDER — AMIODARONE HYDROCHLORIDE 200 MG/1
TABLET ORAL
Qty: 90 TABLET | Refills: 1 | Status: SHIPPED | OUTPATIENT
Start: 2020-05-06 | End: 2021-08-25

## 2020-05-13 ENCOUNTER — TELEPHONE (OUTPATIENT)
Dept: FAMILY MEDICINE CLINIC | Facility: CLINIC | Age: 65
End: 2020-05-13

## 2020-05-13 NOTE — TELEPHONE ENCOUNTER
Wife states that [employer]  was supposed to order a new machine and mask, states [facility] told wife they never received the request.

## 2020-05-26 RX ORDER — OMEPRAZOLE 20 MG/1
20 CAPSULE, DELAYED RELEASE ORAL 2 TIMES DAILY
Qty: 180 CAPSULE | Refills: 0 | Status: SHIPPED | OUTPATIENT
Start: 2020-05-26 | End: 2020-09-19

## 2020-05-26 NOTE — TELEPHONE ENCOUNTER
Future appt:    Last Appointment with provider: 10/11/19 physical; recheck 3 months  Last appointment at [facility]:  Visit date not found  CHOLESTEROL, TOTAL (mg/dL)   Date Value   11/04/2019 126     HDL CHOLESTEROL (mg/dL)   Date Value   11/04/2019 42

## 2020-07-01 RX ORDER — CITALOPRAM 40 MG/1
TABLET ORAL
Qty: 90 TABLET | Refills: 0 | Status: SHIPPED | OUTPATIENT
Start: 2020-07-01 | End: 2020-09-24

## 2020-07-01 RX ORDER — ALLOPURINOL 300 MG/1
TABLET ORAL
Qty: 90 TABLET | Refills: 0 | Status: SHIPPED | OUTPATIENT
Start: 2020-07-01 | End: 2020-09-24

## 2020-07-01 RX ORDER — FUROSEMIDE 80 MG
TABLET ORAL
Qty: 90 TABLET | Refills: 0 | Status: SHIPPED | OUTPATIENT
Start: 2020-07-01 | End: 2020-09-24

## 2020-07-01 RX ORDER — LEVOTHYROXINE SODIUM 0.07 MG/1
TABLET ORAL
Qty: 90 TABLET | Refills: 0 | Status: SHIPPED | OUTPATIENT
Start: 2020-07-01 | End: 2020-09-24

## 2020-07-01 RX ORDER — SPIRONOLACTONE 100 MG/1
TABLET, FILM COATED ORAL
Qty: 90 TABLET | Refills: 0 | Status: SHIPPED | OUTPATIENT
Start: 2020-07-01 | End: 2020-09-24

## 2020-07-01 RX ORDER — NAPROXEN 500 MG/1
TABLET ORAL
Qty: 180 TABLET | Refills: 0 | Status: SHIPPED | OUTPATIENT
Start: 2020-07-01 | End: 2020-09-24

## 2020-07-01 NOTE — TELEPHONE ENCOUNTER
Future appt:    Last Appointment with provider:   Visit date not found  Last appointment at [facility]:  Visit date not found  CHOLESTEROL, TOTAL (mg/dL)   Date Value   11/04/2019 126     HDL CHOLESTEROL (mg/dL)   Date Value   11/04/2019 42     LDL-CHERIE

## 2020-08-21 ENCOUNTER — TELEPHONE (OUTPATIENT)
Dept: FAMILY MEDICINE CLINIC | Facility: CLINIC | Age: 65
End: 2020-08-21

## 2020-08-21 NOTE — TELEPHONE ENCOUNTER
Patient made appt for sleep f/u on 8/24/20, patients wife wants to know if they have to bring patients cpap chip prior to his appt. Would like a call back.

## 2020-08-24 ENCOUNTER — VIRTUAL PHONE E/M (OUTPATIENT)
Dept: FAMILY MEDICINE CLINIC | Facility: CLINIC | Age: 65
End: 2020-08-24
Payer: MEDICARE

## 2020-08-24 DIAGNOSIS — J44.9 COPD, SEVERE (HCC): ICD-10-CM

## 2020-08-24 DIAGNOSIS — G47.33 OBSTRUCTIVE SLEEP APNEA (ADULT) (PEDIATRIC): Primary | ICD-10-CM

## 2020-08-24 PROCEDURE — 99442 PHONE E/M BY PHYS 11-20 MIN: CPT | Performed by: NURSE PRACTITIONER

## 2020-08-24 NOTE — PROGRESS NOTES
[facility]  [facility]        HPI:   This is a 75year old male coming in for Patient presents with:  Obstructive Sleep Apnea (MALA)      HPI:     Phone visit done with patient for sleep follow-up.   States in general that he is d 12/13/2013   • Obesity    • Osteoarthrosis 7/10/2013   • Primary osteoarthritis of left shoulder 7/10/2013   • Sleep apnea 7/10/2013   • Vitamin D deficiency 4/8/2015     Past Surgical History:   Procedure Laterality Date   • OTHER SURGICAL HISTORY  05/201 NAPROXEN 500 MG Oral Tab TAKE 1 TABLET 2 TIMES DAILYWITH FOOD 180 tablet 0   • LEVOTHYROXINE SODIUM 75 MCG Oral Tab TAKE 1 TABLET EVERY MORNINGBEFORE BREAKFAST 90 tablet 0   • omeprazole 20 MG Oral Capsule Delayed Release Take 1 capsule (20 mg total) by mo (hypertension)     GERD (gastroesophageal reflux disease)     Abdominal pannus     Asthma     Depression     Former smoker      REVIEW OF SYSTEMS:   Review of Systems    EXAM:   There were no vitals taken for this visit.  Estimated body mass index is 59.52 hours a night most nights of the week. Recommend weight loss, and maintain and optimal BMI with Exercise 30 minutes most days of the week to target heart rate .      Advised patient to change filters,masks,hoses  and tubes and equiptment on a  regular sche

## 2020-08-24 NOTE — PATIENT INSTRUCTIONS
Continue sleep therapy. Follow-up in 4 months - sooner if needed. Advised if still with sleep apnea and not using CPAP has a  7 fold increase in risk of heart attack, stroke, abnormal heart rhythm  and death,  increased risk of driving accidents.    A

## 2020-09-19 RX ORDER — OMEPRAZOLE 20 MG/1
CAPSULE, DELAYED RELEASE ORAL
Qty: 180 CAPSULE | Refills: 0 | Status: SHIPPED | OUTPATIENT
Start: 2020-09-19 | End: 2021-08-25

## 2020-09-19 NOTE — TELEPHONE ENCOUNTER
Future appt:    Last Appointment with provider: 10/11/2019  Last appointment at [facility] [place]: 10/11/2019  Last px: 10/11/2019    omeprazole 20 MG Oral Capsule Delayed Release #180 with 0 refills, pt to take 1 cap po bid, last refilled on 5/26/2020    CHERIE

## 2020-09-22 NOTE — TELEPHONE ENCOUNTER
LM Needs appt for physical    Future appt:    Last Appointment with provider:  10/11/19  Last appointment at [facility] [place]:  Visit date not found  CHOLESTEROL, TOTAL (mg/dL)   Date Value   11/04/2019 126     HDL CHOLESTEROL (mg/dL)   Date Value   11/04/2019

## 2020-09-24 DIAGNOSIS — E03.9 ACQUIRED HYPOTHYROIDISM: Primary | ICD-10-CM

## 2020-09-24 DIAGNOSIS — F32.4 MAJOR DEPRESSIVE DISORDER IN PARTIAL REMISSION, UNSPECIFIED WHETHER RECURRENT (HCC): Chronic | ICD-10-CM

## 2020-09-24 DIAGNOSIS — M47.816 OSTEOARTHRITIS OF LUMBAR SPINE, UNSPECIFIED SPINAL OSTEOARTHRITIS COMPLICATION STATUS: ICD-10-CM

## 2020-09-24 DIAGNOSIS — I10 BENIGN HYPERTENSION: ICD-10-CM

## 2020-09-24 DIAGNOSIS — M10.9 GOUT, UNSPECIFIED CAUSE, UNSPECIFIED CHRONICITY, UNSPECIFIED SITE: ICD-10-CM

## 2020-09-26 RX ORDER — SPIRONOLACTONE 100 MG/1
100 TABLET, FILM COATED ORAL DAILY
Qty: 30 TABLET | Refills: 0 | Status: SHIPPED | OUTPATIENT
Start: 2020-09-26 | End: 2020-10-15

## 2020-09-26 RX ORDER — LEVOTHYROXINE SODIUM 0.07 MG/1
75 TABLET ORAL
Qty: 30 TABLET | Refills: 0 | Status: SHIPPED | OUTPATIENT
Start: 2020-09-26 | End: 2020-10-15

## 2020-09-26 RX ORDER — FUROSEMIDE 80 MG
80 TABLET ORAL DAILY
Qty: 30 TABLET | Refills: 0 | Status: SHIPPED | OUTPATIENT
Start: 2020-09-26 | End: 2020-10-21

## 2020-09-26 RX ORDER — CITALOPRAM 40 MG/1
40 TABLET ORAL DAILY
Qty: 30 TABLET | Refills: 0 | Status: SHIPPED | OUTPATIENT
Start: 2020-09-26 | End: 2020-10-15

## 2020-09-26 RX ORDER — ALLOPURINOL 300 MG/1
300 TABLET ORAL DAILY
Qty: 30 TABLET | Refills: 0 | Status: SHIPPED | OUTPATIENT
Start: 2020-09-26 | End: 2020-10-15

## 2020-09-26 RX ORDER — GABAPENTIN 400 MG/1
400 CAPSULE ORAL 3 TIMES DAILY
Qty: 90 CAPSULE | Refills: 3 | Status: SHIPPED | OUTPATIENT
Start: 2020-09-26 | End: 2021-08-25

## 2020-09-26 RX ORDER — NAPROXEN 500 MG/1
500 TABLET ORAL 2 TIMES DAILY WITH MEALS
Qty: 60 TABLET | Refills: 0 | Status: SHIPPED | OUTPATIENT
Start: 2020-09-26 | End: 2020-10-15

## 2020-09-26 NOTE — TELEPHONE ENCOUNTER
Future appt:    Last Appointment with provider:  4/29/2020 for a virtual visit; Consider labs and appointment in about 3 months.      [facility] appointment [place] [facility]:  Visit date not found  CHOLESTEROL, TOTAL (mg/dL)   Date Value   11/04/2019 126     HDL C

## 2020-09-26 NOTE — TELEPHONE ENCOUNTER
Dr. Suman Busby out of the office. Patient is overdue for in-office exam.  30 days of prescriptions given, additional refills at exam.     Please have patient schedule appointment.

## 2020-09-30 RX ORDER — LEVOTHYROXINE SODIUM 0.07 MG/1
TABLET ORAL
Qty: 90 TABLET | Refills: 0 | OUTPATIENT
Start: 2020-09-30

## 2020-09-30 RX ORDER — ALLOPURINOL 300 MG/1
TABLET ORAL
Qty: 90 TABLET | Refills: 0 | OUTPATIENT
Start: 2020-09-30

## 2020-09-30 RX ORDER — FUROSEMIDE 80 MG
TABLET ORAL
Qty: 90 TABLET | Refills: 0 | OUTPATIENT
Start: 2020-09-30

## 2020-09-30 RX ORDER — CITALOPRAM 40 MG/1
TABLET ORAL
Qty: 90 TABLET | Refills: 0 | OUTPATIENT
Start: 2020-09-30

## 2020-09-30 RX ORDER — NAPROXEN 500 MG/1
TABLET ORAL
Qty: 180 TABLET | Refills: 0 | OUTPATIENT
Start: 2020-09-30

## 2020-09-30 RX ORDER — SPIRONOLACTONE 100 MG/1
TABLET, FILM COATED ORAL
Qty: 90 TABLET | Refills: 0 | OUTPATIENT
Start: 2020-09-30

## 2020-09-30 NOTE — TELEPHONE ENCOUNTER
No future appointments. Refills denied with notation patient needs appointment. Once appointment scheduled, refills can be sent.

## 2020-09-30 NOTE — TELEPHONE ENCOUNTER
Wife stated that she will have Tamar Corley call us back in regards to scheduling appointment. She stated that he has not been anywhere due to covid and that she does not know how he will feel about coming into the office.

## 2020-10-15 DIAGNOSIS — I10 BENIGN HYPERTENSION: ICD-10-CM

## 2020-10-15 DIAGNOSIS — F32.4 MAJOR DEPRESSIVE DISORDER IN PARTIAL REMISSION, UNSPECIFIED WHETHER RECURRENT (HCC): Chronic | ICD-10-CM

## 2020-10-15 DIAGNOSIS — M10.9 GOUT, UNSPECIFIED CAUSE, UNSPECIFIED CHRONICITY, UNSPECIFIED SITE: ICD-10-CM

## 2020-10-15 DIAGNOSIS — E03.9 ACQUIRED HYPOTHYROIDISM: ICD-10-CM

## 2020-10-15 DIAGNOSIS — M47.816 OSTEOARTHRITIS OF LUMBAR SPINE, UNSPECIFIED SPINAL OSTEOARTHRITIS COMPLICATION STATUS: ICD-10-CM

## 2020-10-15 RX ORDER — NAPROXEN 500 MG/1
TABLET ORAL
Qty: 180 TABLET | Refills: 0 | Status: SHIPPED | OUTPATIENT
Start: 2020-10-15 | End: 2021-01-06

## 2020-10-15 RX ORDER — SPIRONOLACTONE 100 MG/1
TABLET, FILM COATED ORAL
Qty: 90 TABLET | Refills: 0 | Status: SHIPPED | OUTPATIENT
Start: 2020-10-15 | End: 2021-01-18

## 2020-10-15 RX ORDER — ALLOPURINOL 300 MG/1
TABLET ORAL
Qty: 90 TABLET | Refills: 0 | Status: SHIPPED | OUTPATIENT
Start: 2020-10-15 | End: 2021-01-18

## 2020-10-15 RX ORDER — CITALOPRAM 40 MG/1
TABLET ORAL
Qty: 90 TABLET | Refills: 0 | Status: SHIPPED | OUTPATIENT
Start: 2020-10-15 | End: 2021-01-18

## 2020-10-15 RX ORDER — LEVOTHYROXINE SODIUM 0.07 MG/1
TABLET ORAL
Qty: 90 TABLET | Refills: 0 | Status: SHIPPED | OUTPATIENT
Start: 2020-10-15 | End: 2021-01-18

## 2020-10-15 NOTE — TELEPHONE ENCOUNTER
Future appt:    Last Appointment with provider:  4/29/2020 with Dr. Suman Busby for a phone visit; Consider labs and appointment in about 3 months.      Last appointment at [facility]:  Visit date not found  CHOLESTEROL, TOTAL (mg/dL)   Date Value   11/04/2019 12

## 2020-10-21 DIAGNOSIS — I10 BENIGN HYPERTENSION: ICD-10-CM

## 2020-10-21 RX ORDER — FUROSEMIDE 80 MG
TABLET ORAL
Qty: 30 TABLET | Refills: 0 | Status: SHIPPED | OUTPATIENT
Start: 2020-10-21 | End: 2020-11-13

## 2020-10-21 NOTE — TELEPHONE ENCOUNTER
Future appt:    Last Appointment with provider:  4/29/2020 for a virtual visit with Dr. Veronica Teran;  No f/u appt recommended    Last appointment [place] [facility]:  Visit date not found  CHOLESTEROL, TOTAL (mg/dL)   Date Value   11/04/2019 126     HDL CHOLESTEROL (m

## 2020-11-13 DIAGNOSIS — I10 BENIGN HYPERTENSION: ICD-10-CM

## 2020-11-13 NOTE — TELEPHONE ENCOUNTER
[employer] Message Sent:  Due for appt.       Furosemide:  10/21/20    Future appt:    Last Appointment with provider:     Virtual 4/29/20      Last appointment at [facility] [place]:  Visit date not found  CHOLESTEROL, TOTAL (mg/dL)   Date Value   11/04/2019 126

## 2020-11-14 RX ORDER — FUROSEMIDE 80 MG
TABLET ORAL
Qty: 90 TABLET | Refills: 0 | Status: SHIPPED | OUTPATIENT
Start: 2020-11-14 | End: 2021-02-13

## 2021-01-06 DIAGNOSIS — M47.816 OSTEOARTHRITIS OF LUMBAR SPINE, UNSPECIFIED SPINAL OSTEOARTHRITIS COMPLICATION STATUS: ICD-10-CM

## 2021-01-06 RX ORDER — NAPROXEN 500 MG/1
TABLET ORAL
Qty: 180 TABLET | Refills: 0 | Status: SHIPPED | OUTPATIENT
Start: 2021-01-06 | End: 2021-08-25

## 2021-01-06 NOTE — TELEPHONE ENCOUNTER
Pt is due for physical. Pt states that he will not come into the office until he is able to get the covid vaccine. Please advise on refill.     Future appt:    Last Appointment with provider: 4/29/2020- virtual  Last appointment at [facility]: 10/11/2019

## 2021-01-14 DIAGNOSIS — E03.9 ACQUIRED HYPOTHYROIDISM: ICD-10-CM

## 2021-01-14 DIAGNOSIS — M10.9 GOUT, UNSPECIFIED CAUSE, UNSPECIFIED CHRONICITY, UNSPECIFIED SITE: ICD-10-CM

## 2021-01-14 DIAGNOSIS — I10 BENIGN HYPERTENSION: ICD-10-CM

## 2021-01-14 DIAGNOSIS — F32.4 MAJOR DEPRESSIVE DISORDER IN PARTIAL REMISSION, UNSPECIFIED WHETHER RECURRENT (HCC): Chronic | ICD-10-CM

## 2021-01-14 NOTE — TELEPHONE ENCOUNTER
Future appt:    Last Appointment with provider:  4/29/2020 for a virtual visit with Dr. Geni Littlejohn; Consider labs and appointment in about 3 months.       Last appointment at [facility]:  Visit date not found  CHOLESTEROL, TOTAL (mg/dL)   Date Value   11/04/2019

## 2021-01-18 RX ORDER — ALLOPURINOL 300 MG/1
TABLET ORAL
Qty: 90 TABLET | Refills: 0 | Status: SHIPPED | OUTPATIENT
Start: 2021-01-18 | End: 2021-08-25

## 2021-01-18 RX ORDER — LEVOTHYROXINE SODIUM 0.07 MG/1
TABLET ORAL
Qty: 90 TABLET | Refills: 0 | Status: SHIPPED | OUTPATIENT
Start: 2021-01-18 | End: 2021-08-25

## 2021-01-18 RX ORDER — CITALOPRAM 40 MG/1
TABLET ORAL
Qty: 90 TABLET | Refills: 0 | Status: SHIPPED | OUTPATIENT
Start: 2021-01-18 | End: 2021-08-25

## 2021-01-18 RX ORDER — SPIRONOLACTONE 100 MG/1
TABLET, FILM COATED ORAL
Qty: 90 TABLET | Refills: 0 | Status: SHIPPED | OUTPATIENT
Start: 2021-01-18 | End: 2021-08-25

## 2021-01-18 NOTE — TELEPHONE ENCOUNTER
Spoke with patient. Scheduled follow up visit.      Future Appointments   Date Time Provider Shyam Arcei   2/24/2021  1:30 PM Edgar Wiley MD [facility] [place] [facility] [place]

## 2021-02-01 DIAGNOSIS — Z23 NEED FOR VACCINATION: ICD-10-CM

## 2021-02-13 DIAGNOSIS — I10 BENIGN HYPERTENSION: ICD-10-CM

## 2021-02-13 RX ORDER — FUROSEMIDE 80 MG
80 TABLET ORAL DAILY
Qty: 90 TABLET | Refills: 0 | Status: SHIPPED | OUTPATIENT
Start: 2021-02-13 | End: 2021-08-25

## 2021-02-13 NOTE — TELEPHONE ENCOUNTER
Future appt: Your appointments     Date & Time Appointment Department [facility])    Feb 24, 2021  1:30 PM CST Exam - Established with Jose Richards MD [address] [facility] ([facility])            [address] [facility]  [place]  [phone]        Last Appointment with provider:  4/2020 med f/u done via phone visit  Last appointment at [facility]:  Visit date not found  CHOLESTEROL, TOTAL (mg/dL)   Date Value   11/04/2019 126     HDL CHOLESTEROL (mg/dL)   Date Value   11/04/2019 42     LDL-CHOLESTEROL (mg/dL (calc))   Date Value   11/04/2019 68     TRIGLYCERIDES (mg/dL)   Date Value   11/04/2019 84     No results found for: EAG, A1C  Lab Results   Component Value Date    TSH 0.244 (L) 12/14/2017    TSHT4 0.92 11/04/2019       No follow-ups on file.

## 2021-03-09 ENCOUNTER — IMMUNIZATION (OUTPATIENT)
Dept: LAB | Facility: HOSPITAL | Age: 66
End: 2021-03-09
Attending: HOSPITALIST
Payer: MEDICARE

## 2021-03-09 DIAGNOSIS — Z23 NEED FOR VACCINATION: Primary | ICD-10-CM

## 2021-03-09 PROCEDURE — 0011A SARSCOV2 VAC 100MCG/0.5ML IM: CPT

## 2021-04-06 ENCOUNTER — IMMUNIZATION (OUTPATIENT)
Dept: LAB | Facility: HOSPITAL | Age: 66
End: 2021-04-06
Attending: EMERGENCY MEDICINE
Payer: MEDICARE

## 2021-04-06 DIAGNOSIS — Z23 NEED FOR VACCINATION: Primary | ICD-10-CM

## 2021-04-06 PROCEDURE — 0012A SARSCOV2 VAC 100MCG/0.5ML IM: CPT

## 2021-04-14 DIAGNOSIS — I10 BENIGN HYPERTENSION: ICD-10-CM

## 2021-04-14 DIAGNOSIS — F32.4 MAJOR DEPRESSIVE DISORDER IN PARTIAL REMISSION, UNSPECIFIED WHETHER RECURRENT (HCC): Chronic | ICD-10-CM

## 2021-04-14 DIAGNOSIS — M47.816 OSTEOARTHRITIS OF LUMBAR SPINE, UNSPECIFIED SPINAL OSTEOARTHRITIS COMPLICATION STATUS: ICD-10-CM

## 2021-04-14 DIAGNOSIS — E03.9 ACQUIRED HYPOTHYROIDISM: ICD-10-CM

## 2021-04-14 DIAGNOSIS — M10.9 GOUT, UNSPECIFIED CAUSE, UNSPECIFIED CHRONICITY, UNSPECIFIED SITE: ICD-10-CM

## 2021-04-14 NOTE — TELEPHONE ENCOUNTER
See previous refill request encounter. Patient had upcoming appointment scheduled for 2/24/21 at that time. Patient cancelled that appointment via automated message system and never rescheduled. 2/24/21 appointment was made by Aliya Kuo RN in previous refill encounter from January. Previous refill encounters show that we consistently have to reach out to patient to tell him to schedule an appointment to receive a refill. Please advise on refills. Future appt: Your appointments     Date & Time Appointment Department [facility])    Jun 07, 2021  3:00 PM CDT Sleep Follow Up with Prem Castro, [address] [facility] ([facility])            [address] [place] [address]  [place] [id number] [place] [id number] [phone]        Last Appointment with provider:   Visit date not found  Last appointment at [facility] [place]:  Visit date not found  CHOLESTEROL, TOTAL (mg/dL)   Date Value   11/04/2019 126     HDL CHOLESTEROL (mg/dL)   Date Value   11/04/2019 42     LDL-CHOLESTEROL (mg/dL (calc))   Date Value   11/04/2019 68     TRIGLYCERIDES (mg/dL)   Date Value   11/04/2019 84     No results found for: EAG, A1C  Lab Results   Component Value Date    TSH 0.244 (L) 12/14/2017    TSHT4 0.92 11/04/2019       No follow-ups on file.

## 2021-04-15 RX ORDER — NAPROXEN 500 MG/1
TABLET ORAL
Qty: 180 TABLET | Refills: 0 | OUTPATIENT
Start: 2021-04-15

## 2021-04-15 RX ORDER — SPIRONOLACTONE 100 MG/1
TABLET, FILM COATED ORAL
Qty: 90 TABLET | Refills: 0 | OUTPATIENT
Start: 2021-04-15

## 2021-04-15 RX ORDER — ALLOPURINOL 300 MG/1
TABLET ORAL
Qty: 90 TABLET | Refills: 0 | OUTPATIENT
Start: 2021-04-15

## 2021-04-15 RX ORDER — LEVOTHYROXINE SODIUM 0.07 MG/1
TABLET ORAL
Qty: 90 TABLET | Refills: 0 | OUTPATIENT
Start: 2021-04-15

## 2021-04-15 RX ORDER — CITALOPRAM 40 MG/1
TABLET ORAL
Qty: 90 TABLET | Refills: 0 | OUTPATIENT
Start: 2021-04-15

## 2021-04-19 DIAGNOSIS — M47.816 OSTEOARTHRITIS OF LUMBAR SPINE, UNSPECIFIED SPINAL OSTEOARTHRITIS COMPLICATION STATUS: ICD-10-CM

## 2021-04-19 NOTE — TELEPHONE ENCOUNTER
Future appt:     Your appointments     Date & Time Appointment Department [facility])    Jun 07, 2021  3:00 PM CDT Sleep Follow Up with Ras Calderon, [address], [place] ([place])            [address] [place] [address]

## 2021-04-20 RX ORDER — GABAPENTIN 400 MG/1
CAPSULE ORAL
Qty: 90 CAPSULE | Refills: 3 | OUTPATIENT
Start: 2021-04-20

## 2021-05-14 DIAGNOSIS — I10 BENIGN HYPERTENSION: ICD-10-CM

## 2021-05-14 NOTE — TELEPHONE ENCOUNTER
Future appt: Your appointments     Date & Time Appointment Department [facility])    Jun 07, 2021  3:00 PM CDT Sleep Follow Up with Taz John, [address] [facility] [place] ([facility])            [address] [place] [address] [place] [address] [id number] [phone]        Last Appointment with provider: 4/29/2020  Last appointment at [facility]: 8/24/2020  Last px: 10/11/2019    furosemide 80 MG Oral Tab #90 with 0 refills, pt to take 1 tab po daily, last refilled on 2/13/2021    CHOLESTEROL, TOTAL (mg/dL)   Date Value   11/04/2019 126     HDL CHOLESTEROL (mg/dL)   Date Value   11/04/2019 42     LDL-CHOLESTEROL (mg/dL (calc))   Date Value   11/04/2019 68     TRIGLYCERIDES (mg/dL)   Date Value   11/04/2019 84     No results found for: EAG, A1C  Lab Results   Component Value Date    TSH 0.244 (L) 12/14/2017    TSHT4 0.92 11/04/2019       No follow-ups on file.

## 2021-05-17 NOTE — TELEPHONE ENCOUNTER
Future Appointments   Date Time Provider Shyam Gina   6/7/2021  3:00 PM Alberto Beaver, T[facility] [place] [facility] [place]     No appointment made with TR. See my notes in previous refill encounters. The above appt is for patient's MALA.

## 2021-05-18 RX ORDER — FUROSEMIDE 80 MG
TABLET ORAL
Qty: 90 TABLET | Refills: 0 | OUTPATIENT
Start: 2021-05-18

## 2021-07-08 ENCOUNTER — OFFICE VISIT (OUTPATIENT)
Dept: FAMILY MEDICINE CLINIC | Facility: CLINIC | Age: 66
End: 2021-07-08
Payer: MEDICARE

## 2021-07-08 VITALS
RESPIRATION RATE: 20 BRPM | HEART RATE: 79 BPM | BODY MASS INDEX: 49.44 KG/M2 | WEIGHT: 315 LBS | OXYGEN SATURATION: 98 % | TEMPERATURE: 98 F | SYSTOLIC BLOOD PRESSURE: 132 MMHG | HEIGHT: 67 IN | DIASTOLIC BLOOD PRESSURE: 80 MMHG

## 2021-07-08 DIAGNOSIS — G47.61 PERIODIC LIMB MOVEMENT DISORDER: ICD-10-CM

## 2021-07-08 DIAGNOSIS — J44.9 COPD, SEVERE (HCC): ICD-10-CM

## 2021-07-08 DIAGNOSIS — G47.33 OBSTRUCTIVE SLEEP APNEA (ADULT) (PEDIATRIC): Primary | ICD-10-CM

## 2021-07-08 PROCEDURE — 3008F BODY MASS INDEX DOCD: CPT | Performed by: NURSE PRACTITIONER

## 2021-07-08 PROCEDURE — 99214 OFFICE O/P EST MOD 30 MIN: CPT | Performed by: NURSE PRACTITIONER

## 2021-07-08 PROCEDURE — 3075F SYST BP GE 130 - 139MM HG: CPT | Performed by: NURSE PRACTITIONER

## 2021-07-08 PROCEDURE — 3079F DIAST BP 80-89 MM HG: CPT | Performed by: NURSE PRACTITIONER

## 2021-07-08 NOTE — PROGRESS NOTES
[facility]  SLE[facility]        HPI:   This is a 77year old male coming in for Patient presents with:  Obstructive Sleep Apnea (MALA): F/U      HPI:       Patient is present for sleep therapy follow up.  States that sleeping is not • Osteoarthrosis 7/10/2013   • Primary osteoarthritis of left shoulder 7/10/2013   • Sleep apnea 7/10/2013   • Vitamin D deficiency 4/8/2015     Past Surgical History:   Procedure Laterality Date   • OTHER SURGICAL HISTORY  05/2014    obesity surgery - U TABLET EVERY MORNINGBEFORE BREAKFAST 90 tablet 0   • NAPROXEN 500 MG Oral Tab TAKE 1 TABLET 2 TIMES DAILYWITH MEALS 180 tablet 0   • gabapentin 400 MG Oral Cap Take 1 capsule (400 mg total) by mouth 3 (three) times daily.  90 capsule 3   • OMEPRAZOLE 20 MG Asthma     Depression     Former smoker      REVIEW OF SYSTEMS:   Review of Systems   Constitutional: Positive for fatigue. HENT: Negative. Eyes: Negative. Respiratory: Negative. Cardiovascular: Negative. Musculoskeletal: Negative.     Skin: N Pulmonary:      Effort: Pulmonary effort is normal. No respiratory distress. Breath sounds: Examination of the left-lower field reveals wheezing. Wheezing present. Musculoskeletal:         General: Normal range of motion.       Cervical back: Ashia Fix required by insurance for 4 hours a night most nights of the week. Recommend weight loss, and maintain and optimal BMI with Exercise 30 minutes most days of the week to target heart rate .      Advised patient to change filters,masks,hoses  and tubes and e

## 2021-07-08 NOTE — PATIENT INSTRUCTIONS
Continue sleep therapy. Get inline HEPA filter for the hose - [employer], [address], [email or web address]. Change monthly. Follow-up in 6 months - sooner if needed.      Advised if still with sleep apnea and not using CPAP has a  7 fold increase in risk of heart rinku

## 2021-08-11 ENCOUNTER — OFFICE VISIT (OUTPATIENT)
Dept: RHEUMATOLOGY | Age: 66
End: 2021-08-11

## 2021-08-11 VITALS
SYSTOLIC BLOOD PRESSURE: 130 MMHG | HEIGHT: 68 IN | DIASTOLIC BLOOD PRESSURE: 80 MMHG | WEIGHT: 315 LBS | HEART RATE: 88 BPM | BODY MASS INDEX: 47.74 KG/M2

## 2021-08-11 DIAGNOSIS — M25.50 MULTIPLE JOINT PAIN: ICD-10-CM

## 2021-08-11 DIAGNOSIS — M19.012 PRIMARY OSTEOARTHRITIS OF LEFT SHOULDER: ICD-10-CM

## 2021-08-11 DIAGNOSIS — M17.0 PRIMARY OSTEOARTHRITIS OF BOTH KNEES: Primary | ICD-10-CM

## 2021-08-11 PROCEDURE — 99204 OFFICE O/P NEW MOD 45 MIN: CPT | Performed by: INTERNAL MEDICINE

## 2021-08-11 RX ORDER — NYSTATIN 100000 [USP'U]/G
POWDER TOPICAL
COMMUNITY

## 2021-08-11 RX ORDER — OMEPRAZOLE 20 MG/1
CAPSULE, DELAYED RELEASE ORAL
COMMUNITY
Start: 2020-09-19

## 2021-08-11 RX ORDER — GABAPENTIN 400 MG/1
400 CAPSULE ORAL
COMMUNITY
Start: 2020-09-26

## 2021-08-11 RX ORDER — SPIRONOLACTONE 100 MG/1
TABLET, FILM COATED ORAL
COMMUNITY
Start: 2021-01-18

## 2021-08-11 RX ORDER — FUROSEMIDE 80 MG
80 TABLET ORAL
COMMUNITY
Start: 2021-02-13

## 2021-08-11 RX ORDER — AMIODARONE HYDROCHLORIDE 200 MG/1
TABLET ORAL
COMMUNITY
Start: 2020-05-06

## 2021-08-11 RX ORDER — MELATONIN 10 MG
1 CAPSULE ORAL
COMMUNITY

## 2021-08-11 RX ORDER — ALLOPURINOL 300 MG/1
TABLET ORAL
COMMUNITY
Start: 2021-01-18

## 2021-08-11 RX ORDER — CITALOPRAM 40 MG/1
TABLET ORAL
COMMUNITY
Start: 2021-01-18

## 2021-08-11 RX ORDER — ALBUTEROL SULFATE 90 UG/1
1-2 AEROSOL, METERED RESPIRATORY (INHALATION)
COMMUNITY

## 2021-08-11 RX ORDER — LEVOTHYROXINE SODIUM 75 UG/1
75 CAPSULE ORAL
COMMUNITY
Start: 2017-12-20

## 2021-08-11 RX ORDER — NAPROXEN 500 MG/1
TABLET ORAL
COMMUNITY
Start: 2021-01-06

## 2021-08-25 ENCOUNTER — OFFICE VISIT (OUTPATIENT)
Dept: FAMILY MEDICINE CLINIC | Facility: CLINIC | Age: 66
End: 2021-08-25
Payer: MEDICARE

## 2021-08-25 VITALS
BODY MASS INDEX: 49.44 KG/M2 | SYSTOLIC BLOOD PRESSURE: 122 MMHG | OXYGEN SATURATION: 95 % | DIASTOLIC BLOOD PRESSURE: 80 MMHG | HEIGHT: 67 IN | HEART RATE: 61 BPM | RESPIRATION RATE: 18 BRPM | TEMPERATURE: 98 F | WEIGHT: 315 LBS

## 2021-08-25 DIAGNOSIS — M47.816 OSTEOARTHRITIS OF LUMBAR SPINE, UNSPECIFIED SPINAL OSTEOARTHRITIS COMPLICATION STATUS: ICD-10-CM

## 2021-08-25 DIAGNOSIS — E03.9 ACQUIRED HYPOTHYROIDISM: ICD-10-CM

## 2021-08-25 DIAGNOSIS — M10.9 GOUT, UNSPECIFIED CAUSE, UNSPECIFIED CHRONICITY, UNSPECIFIED SITE: ICD-10-CM

## 2021-08-25 DIAGNOSIS — I10 BENIGN HYPERTENSION: Primary | ICD-10-CM

## 2021-08-25 DIAGNOSIS — F32.4 MAJOR DEPRESSIVE DISORDER IN PARTIAL REMISSION, UNSPECIFIED WHETHER RECURRENT (HCC): Chronic | ICD-10-CM

## 2021-08-25 DIAGNOSIS — I48.11 LONGSTANDING PERSISTENT ATRIAL FIBRILLATION (HCC): ICD-10-CM

## 2021-08-25 DIAGNOSIS — K21.9 GASTROESOPHAGEAL REFLUX DISEASE, UNSPECIFIED WHETHER ESOPHAGITIS PRESENT: ICD-10-CM

## 2021-08-25 DIAGNOSIS — J44.9 COPD, SEVERE (HCC): ICD-10-CM

## 2021-08-25 LAB
ALBUMIN SERPL-MCNC: 3.5 G/DL (ref 3.4–5)
ALBUMIN/GLOB SERPL: 0.8 {RATIO} (ref 1–2)
ALP LIVER SERPL-CCNC: 73 U/L
ALT SERPL-CCNC: 15 U/L
ANION GAP SERPL CALC-SCNC: 3 MMOL/L (ref 0–18)
AST SERPL-CCNC: 13 U/L (ref 15–37)
BASOPHILS # BLD AUTO: 0.06 X10(3) UL (ref 0–0.2)
BASOPHILS NFR BLD AUTO: 0.7 %
BILIRUB SERPL-MCNC: 0.4 MG/DL (ref 0.1–2)
BUN BLD-MCNC: 23 MG/DL (ref 7–18)
CALCIUM BLD-MCNC: 8.5 MG/DL (ref 8.5–10.1)
CHLORIDE SERPL-SCNC: 104 MMOL/L (ref 98–112)
CO2 SERPL-SCNC: 30 MMOL/L (ref 21–32)
CREAT BLD-MCNC: 1.36 MG/DL
EOSINOPHIL # BLD AUTO: 0.28 X10(3) UL (ref 0–0.7)
EOSINOPHIL NFR BLD AUTO: 3.1 %
ERYTHROCYTE [DISTWIDTH] IN BLOOD BY AUTOMATED COUNT: 13.2 %
GLOBULIN PLAS-MCNC: 4.2 G/DL (ref 2.8–4.4)
GLUCOSE BLD-MCNC: 92 MG/DL (ref 70–99)
HCT VFR BLD AUTO: 40.3 %
HGB BLD-MCNC: 12.8 G/DL
IMM GRANULOCYTES # BLD AUTO: 0.03 X10(3) UL (ref 0–1)
IMM GRANULOCYTES NFR BLD: 0.3 %
LYMPHOCYTES # BLD AUTO: 1.57 X10(3) UL (ref 1–4)
LYMPHOCYTES NFR BLD AUTO: 17.2 %
M PROTEIN MFR SERPL ELPH: 7.7 G/DL (ref 6.4–8.2)
MCH RBC QN AUTO: 30.3 PG (ref 26–34)
MCHC RBC AUTO-ENTMCNC: 31.8 G/DL (ref 31–37)
MCV RBC AUTO: 95.5 FL
MONOCYTES # BLD AUTO: 0.89 X10(3) UL (ref 0.1–1)
MONOCYTES NFR BLD AUTO: 9.8 %
NEUTROPHILS # BLD AUTO: 6.28 X10 (3) UL (ref 1.5–7.7)
NEUTROPHILS # BLD AUTO: 6.28 X10(3) UL (ref 1.5–7.7)
NEUTROPHILS NFR BLD AUTO: 68.9 %
OSMOLALITY SERPL CALC.SUM OF ELEC: 287 MOSM/KG (ref 275–295)
PATIENT FASTING Y/N/NP: NO
PLATELET # BLD AUTO: 234 10(3)UL (ref 150–450)
POTASSIUM SERPL-SCNC: 4.4 MMOL/L (ref 3.5–5.1)
RBC # BLD AUTO: 4.22 X10(6)UL
SODIUM SERPL-SCNC: 137 MMOL/L (ref 136–145)
T4 FREE SERPL-MCNC: 1 NG/DL (ref 0.8–1.7)
TSI SER-ACNC: 1.57 MIU/ML (ref 0.36–3.74)
URATE SERPL-MCNC: 5.6 MG/DL
WBC # BLD AUTO: 9.1 X10(3) UL (ref 4–11)

## 2021-08-25 PROCEDURE — 99214 OFFICE O/P EST MOD 30 MIN: CPT | Performed by: NURSE PRACTITIONER

## 2021-08-25 PROCEDURE — 84550 ASSAY OF BLOOD/URIC ACID: CPT | Performed by: NURSE PRACTITIONER

## 2021-08-25 PROCEDURE — 3008F BODY MASS INDEX DOCD: CPT | Performed by: NURSE PRACTITIONER

## 2021-08-25 PROCEDURE — 84439 ASSAY OF FREE THYROXINE: CPT | Performed by: NURSE PRACTITIONER

## 2021-08-25 PROCEDURE — 83550 IRON BINDING TEST: CPT | Performed by: NURSE PRACTITIONER

## 2021-08-25 PROCEDURE — 3074F SYST BP LT 130 MM HG: CPT | Performed by: NURSE PRACTITIONER

## 2021-08-25 PROCEDURE — 80053 COMPREHEN METABOLIC PANEL: CPT | Performed by: NURSE PRACTITIONER

## 2021-08-25 PROCEDURE — 82728 ASSAY OF FERRITIN: CPT | Performed by: NURSE PRACTITIONER

## 2021-08-25 PROCEDURE — 84443 ASSAY THYROID STIM HORMONE: CPT | Performed by: NURSE PRACTITIONER

## 2021-08-25 PROCEDURE — 83540 ASSAY OF IRON: CPT | Performed by: NURSE PRACTITIONER

## 2021-08-25 PROCEDURE — 85025 COMPLETE CBC W/AUTO DIFF WBC: CPT | Performed by: NURSE PRACTITIONER

## 2021-08-25 PROCEDURE — 3079F DIAST BP 80-89 MM HG: CPT | Performed by: NURSE PRACTITIONER

## 2021-08-25 RX ORDER — LEVOTHYROXINE SODIUM 0.07 MG/1
75 TABLET ORAL
Qty: 90 TABLET | Refills: 1 | Status: SHIPPED | OUTPATIENT
Start: 2021-08-25 | End: 2022-01-24

## 2021-08-25 RX ORDER — AMIODARONE HYDROCHLORIDE 200 MG/1
100 TABLET ORAL DAILY
Qty: 45 TABLET | Refills: 1 | Status: SHIPPED | OUTPATIENT
Start: 2021-08-25

## 2021-08-25 RX ORDER — GABAPENTIN 400 MG/1
400 CAPSULE ORAL 3 TIMES DAILY
Qty: 270 CAPSULE | Refills: 1 | Status: SHIPPED | OUTPATIENT
Start: 2021-08-25 | End: 2022-01-24

## 2021-08-25 RX ORDER — ALLOPURINOL 300 MG/1
300 TABLET ORAL DAILY
Qty: 90 TABLET | Refills: 1 | Status: SHIPPED | OUTPATIENT
Start: 2021-08-25 | End: 2022-01-24

## 2021-08-25 RX ORDER — FUROSEMIDE 80 MG
80 TABLET ORAL DAILY
Qty: 90 TABLET | Refills: 1 | Status: SHIPPED | OUTPATIENT
Start: 2021-08-25 | End: 2022-01-24

## 2021-08-25 RX ORDER — OMEPRAZOLE 20 MG/1
20 CAPSULE, DELAYED RELEASE ORAL EVERY MORNING
Qty: 90 CAPSULE | Refills: 1 | Status: SHIPPED | OUTPATIENT
Start: 2021-08-25

## 2021-08-25 RX ORDER — BUDESONIDE AND FORMOTEROL FUMARATE DIHYDRATE 80; 4.5 UG/1; UG/1
2 AEROSOL RESPIRATORY (INHALATION) 2 TIMES DAILY
Qty: 3 EACH | Refills: 1 | Status: SHIPPED | OUTPATIENT
Start: 2021-08-25

## 2021-08-25 RX ORDER — SPIRONOLACTONE 100 MG/1
100 TABLET, FILM COATED ORAL DAILY
Qty: 90 TABLET | Refills: 1 | Status: SHIPPED | OUTPATIENT
Start: 2021-08-25 | End: 2022-01-24

## 2021-08-25 RX ORDER — CITALOPRAM 40 MG/1
40 TABLET ORAL DAILY
Qty: 90 TABLET | Refills: 1 | Status: SHIPPED | OUTPATIENT
Start: 2021-08-25 | End: 2022-01-24

## 2021-08-25 NOTE — PROGRESS NOTES
[facility] [place]  PROGRESS NOTE  Chief Complaint:   Patient presents with:  Medication Follow-Up  Leg or Foot Injury: possible shin splints       HPI:   This is a 77year old male coming in for medication refill.     Patient is here for [facility] deficiency 4/8/2015     Past Surgical History:   Procedure Laterality Date   • OTHER SURGICAL HISTORY  05/[facility]    [facility]     Social History:  Social History    Tobacco Use      Smoking status: Former Smoker        Years: 16.00 daily. 90 tablet 1   • citalopram 40 MG Oral Tab Take 1 tablet (40 mg total) by mouth daily. 90 tablet 1   • Budesonide-Formoterol Fumarate 80-4.5 MCG/ACT Inhalation Aerosol Inhale 2 puffs into the lungs 2 (two) times daily. Rinse mouth after use.  3 each 1 intolerance, polyuria or polydipsia. ALLERGIES:  Denies allergic response, history of asthma, sneezing, hives, eczema or rhinitis.      EXAM:   /80   Pulse 61   Temp 97.5 °F (36.4 °C) (Temporal)   Resp 18   Ht 5' 7\" (1.702 m)   Wt (!) 426 lb (193.2 reflexes. ASSESSMENT AND PLAN:     1. Benign hypertension  Patient is overdue for lab work. Labs today. Refills of medications given. Patient overdue for Medicare advantage physical as well as cardiology follow-up, recommend scheduling.   Patient to b elevated BMI. Continue with rheumatology management with Tylenol arthritis, avoid NSAIDs secondary to renal insufficiency, will recheck today. Weight loss clinic referral given. - gabapentin 400 MG Oral Cap;  Take 1 capsule (400 mg total) by mouth 3 (thr TSH+FREE T4      Patient/Caregiver Education: Patient/Caregiver Education: There are no barriers to learning. Medical education done. Outcome: Patient verbalizes understanding.  Patient is notified to call with any questions, complications, allergies, or 10/11/2020  Pneumococcal Vaccine: 65+ Years(2 of 2 - PPSV23) due on 05/09/2021      Problem List:  Patient Active Problem List:     Morbid obesity with body mass index of 70 and over in adult [facility])     Anxiety state     COPD, severe ([address]     Dermatitis

## 2021-08-25 NOTE — PATIENT INSTRUCTIONS
Labs and urine today. Refills today. Schedule a routine follow up with your cardiologist, Dr. Jerad Aldana. Schedule a Medicare Advantage Physical with Dr. Parag Ervin in the next 4-6 weeks. Weight Loss Clinic through [facility], Dr. Kayla Ariza or associates.

## 2021-08-26 ENCOUNTER — TELEPHONE (OUTPATIENT)
Dept: FAMILY MEDICINE CLINIC | Facility: CLINIC | Age: 66
End: 2021-08-26

## 2021-08-26 PROBLEM — R71.0 DECREASED HEMOGLOBIN: Status: ACTIVE | Noted: 2021-08-26

## 2021-08-26 NOTE — TELEPHONE ENCOUNTER
----- Message from TREVON Brantley sent at 8/26/2021  4:19 PM CDT -----  Patient's uric acid in normal range at 5 6. Thyroid in normal range. Metabolic panel with ongoing decreased renal function though slightly improved from 2019.   Hemoglobin slightl

## 2021-08-27 NOTE — TELEPHONE ENCOUNTER
----- Message from TREVON Paiz sent at 8/27/2021  8:08 AM CDT -----  Low iron levels and iron saturation. Any sign of bleeding? Any dark tarry stools? If not, start iron supplement 324mg once a day with vitamin C and recheck in 3 months.

## 2021-08-27 NOTE — TELEPHONE ENCOUNTER
Patient informed of the below results and recommendations. Patient denies symptoms listed below. Patient will start OTC medications and c/b to schedule a lab appt in 3 months.

## 2021-09-01 ENCOUNTER — APPOINTMENT (OUTPATIENT)
Dept: RHEUMATOLOGY | Age: 66
End: 2021-09-01

## 2021-09-10 ENCOUNTER — APPOINTMENT (OUTPATIENT)
Dept: RHEUMATOLOGY | Age: 66
End: 2021-09-10

## 2021-09-22 ENCOUNTER — APPOINTMENT (OUTPATIENT)
Dept: RHEUMATOLOGY | Age: 66
End: 2021-09-22

## 2022-01-24 ENCOUNTER — TELEPHONE (OUTPATIENT)
Dept: FAMILY MEDICINE CLINIC | Facility: CLINIC | Age: 67
End: 2022-01-24

## 2022-01-24 DIAGNOSIS — I10 BENIGN HYPERTENSION: ICD-10-CM

## 2022-01-24 DIAGNOSIS — M10.9 GOUT, UNSPECIFIED CAUSE, UNSPECIFIED CHRONICITY, UNSPECIFIED SITE: ICD-10-CM

## 2022-01-24 DIAGNOSIS — E03.9 ACQUIRED HYPOTHYROIDISM: ICD-10-CM

## 2022-01-24 DIAGNOSIS — M47.816 OSTEOARTHRITIS OF LUMBAR SPINE, UNSPECIFIED SPINAL OSTEOARTHRITIS COMPLICATION STATUS: ICD-10-CM

## 2022-01-24 DIAGNOSIS — F32.4 MAJOR DEPRESSIVE DISORDER IN PARTIAL REMISSION, UNSPECIFIED WHETHER RECURRENT (HCC): Chronic | ICD-10-CM

## 2022-01-24 RX ORDER — SPIRONOLACTONE 100 MG/1
100 TABLET, FILM COATED ORAL DAILY
Qty: 90 TABLET | Refills: 1 | Status: SHIPPED | OUTPATIENT
Start: 2022-01-24

## 2022-01-24 RX ORDER — ALLOPURINOL 300 MG/1
300 TABLET ORAL DAILY
Qty: 90 TABLET | Refills: 1 | Status: SHIPPED | OUTPATIENT
Start: 2022-01-24

## 2022-01-24 RX ORDER — GABAPENTIN 400 MG/1
400 CAPSULE ORAL 3 TIMES DAILY
Qty: 270 CAPSULE | Refills: 1 | Status: SHIPPED | OUTPATIENT
Start: 2022-01-24

## 2022-01-24 RX ORDER — CITALOPRAM 40 MG/1
40 TABLET ORAL DAILY
Qty: 90 TABLET | Refills: 1 | Status: SHIPPED | OUTPATIENT
Start: 2022-01-24

## 2022-01-24 RX ORDER — LEVOTHYROXINE SODIUM 0.07 MG/1
75 TABLET ORAL
Qty: 90 TABLET | Refills: 1 | Status: SHIPPED | OUTPATIENT
Start: 2022-01-24

## 2022-01-24 RX ORDER — FUROSEMIDE 80 MG
80 TABLET ORAL DAILY
Qty: 90 TABLET | Refills: 1 | Status: SHIPPED | OUTPATIENT
Start: 2022-01-24

## 2022-01-24 NOTE — TELEPHONE ENCOUNTER
LM for return phone call. Pt is overdue for labs and Physical.    Future appt:  2/2/21 - appt cancelled.   Last Appointment with provider:   10/11/2019 with TR for physical.  Last appointment at [facility]:  8/25/2021 with ELVIRA    CHOLESTEROL, TOTAL (mg/dL)

## 2022-02-09 RX ORDER — AMIODARONE HYDROCHLORIDE 200 MG/1
100 TABLET ORAL DAILY
Qty: 45 TABLET | Refills: 0 | Status: SHIPPED | OUTPATIENT
Start: 2022-02-09

## 2022-02-09 RX ORDER — BUDESONIDE AND FORMOTEROL FUMARATE DIHYDRATE 80; 4.5 UG/1; UG/1
2 AEROSOL RESPIRATORY (INHALATION) 2 TIMES DAILY
Qty: 3 EACH | Refills: 0 | Status: SHIPPED | OUTPATIENT
Start: 2022-02-09

## 2022-02-09 RX ORDER — OMEPRAZOLE 20 MG/1
20 CAPSULE, DELAYED RELEASE ORAL EVERY MORNING
Qty: 90 CAPSULE | Refills: 0 | Status: SHIPPED | OUTPATIENT
Start: 2022-02-09

## 2022-02-09 NOTE — TELEPHONE ENCOUNTER
Future appt:    Last Appointment with provider:  8/25/2021; Schedule a Medicare Advantage Physical with Dr. Epifanio Sullivan in the next 4-6 weeks. Last appointment at [facility]:  8/25/2021  CHOLESTEROL, TOTAL (mg/dL)   Date Value   11/04/2019 126     HDL CHOLESTEROL (mg/dL)   Date Value   11/04/2019 42     LDL-CHOLESTEROL (mg/dL (calc))   Date Value   11/04/2019 68     TRIGLYCERIDES (mg/dL)   Date Value   11/04/2019 84     No results found for: EAG, A1C  Lab Results   Component Value Date    T4F 1.0 08/25/2021    TSH 1.570 08/25/2021    TSHT4 0.92 11/04/2019     Last RF:  8/25/2021    No follow-ups on file.

## 2022-02-09 NOTE — TELEPHONE ENCOUNTER
Patient is due for an appt. Spoke with patient's wife Dali Barraza. States she currently in the hospital and doesn't know patient's phone number by heart (we don't have any other numbers on file). States she/pt is aware that patient needs an appt. States patient had one scheduled but then had to cancel because they are currently without a car. States their new car will be delivered after 2/23/2022. Wife states she will relay the message to patient to schedule an appointment with Dr. Shayy Dunn as soon as possible.

## 2022-02-09 NOTE — TELEPHONE ENCOUNTER
See other phone note from today regarding an appt needed. Wife/pt aware. Future appt:    Last Appointment with provider:   8/25/2021  Last appointment at [facility]:  8/25/2021  CHOLESTEROL, TOTAL (mg/dL)   Date Value   11/04/2019 126     HDL CHOLESTEROL (mg/dL)   Date Value   11/04/2019 42     LDL-CHOLESTEROL (mg/dL (calc))   Date Value   11/04/2019 68     TRIGLYCERIDES (mg/dL)   Date Value   11/04/2019 84     No results found for: EAG, A1C  Lab Results   Component Value Date    T4F 1.0 08/25/2021    TSH 1.570 08/25/2021    TSHT4 0.92 11/04/2019     Last RF:  8/25/2021    No follow-ups on file.

## 2022-03-18 ENCOUNTER — PATIENT OUTREACH (OUTPATIENT)
Dept: FAMILY MEDICINE CLINIC | Facility: CLINIC | Age: 67
End: 2022-03-18

## 2022-04-28 ENCOUNTER — TELEPHONE (OUTPATIENT)
Dept: FAMILY MEDICINE CLINIC | Facility: CLINIC | Age: 67
End: 2022-04-28

## 2022-04-28 NOTE — TELEPHONE ENCOUNTER
Patient is requesting lab orders to [facility] ahead of 5/24/22 physical. Please advise.

## 2022-04-28 NOTE — TELEPHONE ENCOUNTER
sched px and labs for May/ needs lab order/ px was made with Keyla Maddox since couldn't see Dr. Cade Carrion til July

## 2022-05-02 NOTE — TELEPHONE ENCOUNTER
Pt notified that preferred lab is [facility] and that is where orders were placed for.  Pt is understanding that having labs drawn at this office might result in a higher cost.

## 2022-05-03 RX ORDER — OMEPRAZOLE 20 MG/1
CAPSULE, DELAYED RELEASE ORAL
Qty: 90 CAPSULE | Refills: 0 | Status: SHIPPED | OUTPATIENT
Start: 2022-05-03

## 2022-05-03 RX ORDER — AMIODARONE HYDROCHLORIDE 200 MG/1
TABLET ORAL
Qty: 45 TABLET | Refills: 0 | Status: SHIPPED | OUTPATIENT
Start: 2022-05-03

## 2022-05-03 NOTE — TELEPHONE ENCOUNTER
Future appt: Your appointments     Date & Time Appointment Department [facility])    May 17, 2022  1:00 PM CDT Laboratory Visit with [facility] ([facility] [place])        May 24, 2022  1:30 PM CDT MA Supervisit with éCsar Rashid, [address] [place] [address], [place] ([facility])            [facility] [place]  [facility] [phone]  [phone] [facility]  [facility] [place] [address] [id number]  [phone]        Last Appointment with provider:   Visit date not found  Last appointment at [facility] [place]:  Visit date not found  CHOLESTEROL, TOTAL (mg/dL)   Date Value   11/04/2019 126     HDL CHOLESTEROL (mg/dL)   Date Value   11/04/2019 42     LDL-CHOLESTEROL (mg/dL (calc))   Date Value   11/04/2019 68     TRIGLYCERIDES (mg/dL)   Date Value   11/04/2019 84     No results found for: EAG, A1C  Lab Results   Component Value Date    T4F 1.0 08/25/2021    TSH 1.570 08/25/2021    TSHT4 0.92 11/04/2019     Last RF:  2/9/2022    No follow-ups on file.

## 2022-05-19 ENCOUNTER — LABORATORY ENCOUNTER (OUTPATIENT)
Dept: LAB | Age: 67
End: 2022-05-19
Attending: FAMILY MEDICINE
Payer: MEDICARE

## 2022-05-19 DIAGNOSIS — D50.9 IRON DEFICIENCY ANEMIA, UNSPECIFIED IRON DEFICIENCY ANEMIA TYPE: ICD-10-CM

## 2022-05-19 DIAGNOSIS — E66.01 MORBID OBESITY WITH BODY MASS INDEX OF 70 AND OVER IN ADULT (HCC): ICD-10-CM

## 2022-05-19 DIAGNOSIS — I10 BENIGN ESSENTIAL HYPERTENSION: Primary | ICD-10-CM

## 2022-05-19 LAB
ALBUMIN SERPL-MCNC: 4.2 G/DL (ref 3.4–5)
ALBUMIN/GLOB SERPL: 1.2 {RATIO} (ref 1–2)
ALP LIVER SERPL-CCNC: 92 U/L
ALT SERPL-CCNC: 13 U/L
ANION GAP SERPL CALC-SCNC: 7 MMOL/L (ref 0–18)
AST SERPL-CCNC: 13 U/L (ref 15–37)
BASOPHILS # BLD AUTO: 0.07 X10(3) UL (ref 0–0.2)
BASOPHILS NFR BLD AUTO: 0.7 %
BILIRUB SERPL-MCNC: 0.6 MG/DL (ref 0.1–2)
BUN BLD-MCNC: 23 MG/DL (ref 7–18)
CALCIUM BLD-MCNC: 9.3 MG/DL (ref 8.5–10.1)
CHLORIDE SERPL-SCNC: 104 MMOL/L (ref 98–112)
CHOLEST SERPL-MCNC: 124 MG/DL (ref ?–200)
CO2 SERPL-SCNC: 29 MMOL/L (ref 21–32)
CREAT BLD-MCNC: 1.67 MG/DL
DEPRECATED HBV CORE AB SER IA-ACNC: 84.7 NG/ML
EOSINOPHIL # BLD AUTO: 0.32 X10(3) UL (ref 0–0.7)
EOSINOPHIL NFR BLD AUTO: 3.3 %
ERYTHROCYTE [DISTWIDTH] IN BLOOD BY AUTOMATED COUNT: 13.3 %
FASTING PATIENT LIPID ANSWER: YES
FASTING STATUS PATIENT QL REPORTED: YES
GLOBULIN PLAS-MCNC: 3.6 G/DL (ref 2.8–4.4)
GLUCOSE BLD-MCNC: 86 MG/DL (ref 70–99)
HCT VFR BLD AUTO: 43 %
HDLC SERPL-MCNC: 42 MG/DL (ref 40–59)
HGB BLD-MCNC: 13.5 G/DL
IMM GRANULOCYTES # BLD AUTO: 0.02 X10(3) UL (ref 0–1)
IMM GRANULOCYTES NFR BLD: 0.2 %
IRON SATN MFR SERPL: 12 %
IRON SERPL-MCNC: 44 UG/DL
LDLC SERPL CALC-MCNC: 63 MG/DL (ref ?–100)
LYMPHOCYTES # BLD AUTO: 1.34 X10(3) UL (ref 1–4)
LYMPHOCYTES NFR BLD AUTO: 13.7 %
MCH RBC QN AUTO: 31.1 PG (ref 26–34)
MCHC RBC AUTO-ENTMCNC: 31.4 G/DL (ref 31–37)
MCV RBC AUTO: 99.1 FL
MONOCYTES # BLD AUTO: 0.79 X10(3) UL (ref 0.1–1)
MONOCYTES NFR BLD AUTO: 8.1 %
NEUTROPHILS # BLD AUTO: 7.26 X10 (3) UL (ref 1.5–7.7)
NEUTROPHILS # BLD AUTO: 7.26 X10(3) UL (ref 1.5–7.7)
NEUTROPHILS NFR BLD AUTO: 74 %
NONHDLC SERPL-MCNC: 82 MG/DL (ref ?–130)
OSMOLALITY SERPL CALC.SUM OF ELEC: 293 MOSM/KG (ref 275–295)
PLATELET # BLD AUTO: 217 10(3)UL (ref 150–450)
POTASSIUM SERPL-SCNC: 4.7 MMOL/L (ref 3.5–5.1)
PROT SERPL-MCNC: 7.8 G/DL (ref 6.4–8.2)
PSA SERPL-MCNC: 0.35 NG/ML (ref ?–4)
RBC # BLD AUTO: 4.34 X10(6)UL
SODIUM SERPL-SCNC: 140 MMOL/L (ref 136–145)
T4 FREE SERPL-MCNC: 1.2 NG/DL (ref 0.8–1.7)
TIBC SERPL-MCNC: 364 UG/DL (ref 240–450)
TRANSFERRIN SERPL-MCNC: 244 MG/DL (ref 200–360)
TRIGL SERPL-MCNC: 101 MG/DL (ref 30–149)
TSI SER-ACNC: 1.44 MIU/ML (ref 0.36–3.74)
URATE SERPL-MCNC: 5.6 MG/DL
VIT B12 SERPL-MCNC: 668 PG/ML (ref 193–986)
VIT D+METAB SERPL-MCNC: 36.8 NG/ML (ref 30–100)
VLDLC SERPL CALC-MCNC: 15 MG/DL (ref 0–30)
WBC # BLD AUTO: 9.8 X10(3) UL (ref 4–11)

## 2022-05-19 PROCEDURE — 84153 ASSAY OF PSA TOTAL: CPT | Performed by: NURSE PRACTITIONER

## 2022-05-19 PROCEDURE — 84439 ASSAY OF FREE THYROXINE: CPT | Performed by: NURSE PRACTITIONER

## 2022-05-19 PROCEDURE — 85025 COMPLETE CBC W/AUTO DIFF WBC: CPT | Performed by: NURSE PRACTITIONER

## 2022-05-19 PROCEDURE — 3079F DIAST BP 80-89 MM HG: CPT | Performed by: NURSE PRACTITIONER

## 2022-05-19 PROCEDURE — 3075F SYST BP GE 130 - 139MM HG: CPT | Performed by: NURSE PRACTITIONER

## 2022-05-19 PROCEDURE — 84443 ASSAY THYROID STIM HORMONE: CPT | Performed by: NURSE PRACTITIONER

## 2022-05-19 PROCEDURE — 82607 VITAMIN B-12: CPT | Performed by: NURSE PRACTITIONER

## 2022-05-19 PROCEDURE — 3008F BODY MASS INDEX DOCD: CPT | Performed by: NURSE PRACTITIONER

## 2022-05-19 PROCEDURE — 83550 IRON BINDING TEST: CPT | Performed by: NURSE PRACTITIONER

## 2022-05-19 PROCEDURE — 36415 COLL VENOUS BLD VENIPUNCTURE: CPT | Performed by: NURSE PRACTITIONER

## 2022-05-19 PROCEDURE — 83540 ASSAY OF IRON: CPT | Performed by: NURSE PRACTITIONER

## 2022-05-19 PROCEDURE — 82728 ASSAY OF FERRITIN: CPT | Performed by: NURSE PRACTITIONER

## 2022-05-19 PROCEDURE — 96127 BRIEF EMOTIONAL/BEHAV ASSMT: CPT | Performed by: NURSE PRACTITIONER

## 2022-05-19 PROCEDURE — 82306 VITAMIN D 25 HYDROXY: CPT | Performed by: NURSE PRACTITIONER

## 2022-05-19 PROCEDURE — 84550 ASSAY OF BLOOD/URIC ACID: CPT | Performed by: NURSE PRACTITIONER

## 2022-05-19 PROCEDURE — 80061 LIPID PANEL: CPT

## 2022-05-19 PROCEDURE — 80053 COMPREHEN METABOLIC PANEL: CPT | Performed by: NURSE PRACTITIONER

## 2022-05-24 ENCOUNTER — TELEPHONE (OUTPATIENT)
Dept: FAMILY MEDICINE CLINIC | Facility: CLINIC | Age: 67
End: 2022-05-24

## 2022-05-24 NOTE — TELEPHONE ENCOUNTER
----- Message from TREVON Evans sent at 5/23/2022  9:56 PM CDT -----  Please make sure patient receives MyChart messages below-Your blood work shows decreased but stable kidney function-recommend avoiding ibuprofen or other NSAIDs and drinking plenty of fluids. Your iron is a little low are you taking an iron supplement? \  Thyroid is normal, uric acid is normal, PSA is normal, B12 is normal, CBC is normal, vitamin D is normal, however, it could be a little higher would recommend increasing vitamin D supplement by 1000 units daily. Will review labs further at your upcoming appointment. Thank TREVON Wilkins, FNP-BC

## 2022-05-25 ENCOUNTER — OFFICE VISIT (OUTPATIENT)
Dept: FAMILY MEDICINE CLINIC | Facility: CLINIC | Age: 67
End: 2022-05-25
Payer: MEDICARE

## 2022-05-25 VITALS
HEIGHT: 66.5 IN | TEMPERATURE: 98 F | DIASTOLIC BLOOD PRESSURE: 82 MMHG | OXYGEN SATURATION: 97 % | BODY MASS INDEX: 50.03 KG/M2 | SYSTOLIC BLOOD PRESSURE: 132 MMHG | HEART RATE: 54 BPM | WEIGHT: 315 LBS | RESPIRATION RATE: 18 BRPM

## 2022-05-25 DIAGNOSIS — F32.A ANXIETY AND DEPRESSION: ICD-10-CM

## 2022-05-25 DIAGNOSIS — M54.50 LOW BACK PAIN, UNSPECIFIED BACK PAIN LATERALITY, UNSPECIFIED CHRONICITY, UNSPECIFIED WHETHER SCIATICA PRESENT: ICD-10-CM

## 2022-05-25 DIAGNOSIS — E65 ABDOMINAL PANNUS: ICD-10-CM

## 2022-05-25 DIAGNOSIS — J45.909 ASTHMA, UNSPECIFIED ASTHMA SEVERITY, UNSPECIFIED WHETHER COMPLICATED, UNSPECIFIED WHETHER PERSISTENT: ICD-10-CM

## 2022-05-25 DIAGNOSIS — G47.8 SLEEP AROUSAL DISORDER: ICD-10-CM

## 2022-05-25 DIAGNOSIS — G89.4 CHRONIC PAIN SYNDROME: ICD-10-CM

## 2022-05-25 DIAGNOSIS — E55.9 VITAMIN D DEFICIENCY: ICD-10-CM

## 2022-05-25 DIAGNOSIS — K52.9 COLITIS: ICD-10-CM

## 2022-05-25 DIAGNOSIS — Z00.00 ENCOUNTER FOR ANNUAL HEALTH EXAMINATION: Primary | ICD-10-CM

## 2022-05-25 DIAGNOSIS — E66.01 CLASS 3 SEVERE OBESITY DUE TO EXCESS CALORIES WITH SERIOUS COMORBIDITY AND BODY MASS INDEX (BMI) OF 60.0 TO 69.9 IN ADULT (HCC): ICD-10-CM

## 2022-05-25 DIAGNOSIS — G47.61 PERIODIC LIMB MOVEMENT DISORDER: ICD-10-CM

## 2022-05-25 DIAGNOSIS — J44.9 COPD, SEVERE (HCC): ICD-10-CM

## 2022-05-25 DIAGNOSIS — R60.9 EDEMA, UNSPECIFIED TYPE: ICD-10-CM

## 2022-05-25 DIAGNOSIS — M25.569 ARTHRALGIA OF LOWER LEG, UNSPECIFIED LATERALITY: ICD-10-CM

## 2022-05-25 DIAGNOSIS — G47.10 HYPERSOMNIA: ICD-10-CM

## 2022-05-25 DIAGNOSIS — B35.4 TINEA CORPORIS: ICD-10-CM

## 2022-05-25 DIAGNOSIS — L30.9 DERMATITIS: ICD-10-CM

## 2022-05-25 DIAGNOSIS — M10.9 GOUT, UNSPECIFIED CAUSE, UNSPECIFIED CHRONICITY, UNSPECIFIED SITE: ICD-10-CM

## 2022-05-25 DIAGNOSIS — E03.9 ACQUIRED HYPOTHYROIDISM: ICD-10-CM

## 2022-05-25 DIAGNOSIS — G47.33 OBSTRUCTIVE SLEEP APNEA (ADULT) (PEDIATRIC): ICD-10-CM

## 2022-05-25 DIAGNOSIS — D50.8 IRON DEFICIENCY ANEMIA SECONDARY TO INADEQUATE DIETARY IRON INTAKE: ICD-10-CM

## 2022-05-25 DIAGNOSIS — K21.9 GASTROESOPHAGEAL REFLUX DISEASE, UNSPECIFIED WHETHER ESOPHAGITIS PRESENT: ICD-10-CM

## 2022-05-25 DIAGNOSIS — I73.00 RAYNAUD'S PHENOMENON WITHOUT GANGRENE: ICD-10-CM

## 2022-05-25 DIAGNOSIS — N18.30 CHRONIC RENAL INSUFFICIENCY, STAGE 3 (MODERATE) (HCC): ICD-10-CM

## 2022-05-25 DIAGNOSIS — F41.9 ANXIETY AND DEPRESSION: ICD-10-CM

## 2022-05-25 DIAGNOSIS — M17.0 ARTHRITIS OF BOTH KNEES: ICD-10-CM

## 2022-05-25 DIAGNOSIS — F32.4 MAJOR DEPRESSIVE DISORDER IN PARTIAL REMISSION, UNSPECIFIED WHETHER RECURRENT (HCC): Chronic | ICD-10-CM

## 2022-05-25 DIAGNOSIS — G60.9 HEREDITARY AND IDIOPATHIC PERIPHERAL NEUROPATHY: ICD-10-CM

## 2022-05-25 DIAGNOSIS — I48.11 LONGSTANDING PERSISTENT ATRIAL FIBRILLATION (HCC): ICD-10-CM

## 2022-05-25 DIAGNOSIS — Z12.11 SCREENING FOR COLON CANCER: ICD-10-CM

## 2022-05-25 DIAGNOSIS — M19.019 LOCALIZED, PRIMARY OSTEOARTHRITIS OF SHOULDER REGION, UNSPECIFIED LATERALITY: ICD-10-CM

## 2022-05-25 DIAGNOSIS — I10 BENIGN ESSENTIAL HYPERTENSION: ICD-10-CM

## 2022-05-25 DIAGNOSIS — Z87.891 FORMER SMOKER: ICD-10-CM

## 2022-05-25 PROBLEM — M25.519 SHOULDER JOINT PAIN: Status: ACTIVE | Noted: 2022-05-25

## 2022-05-25 PROBLEM — L03.114 CELLULITIS OF LEFT UPPER LIMB: Status: ACTIVE | Noted: 2022-05-25

## 2022-05-25 PROBLEM — S83.242A ACUTE MEDIAL MENISCUS TEAR OF LEFT KNEE: Status: ACTIVE | Noted: 2022-05-25

## 2022-05-25 PROBLEM — R71.0 DECREASED HEMOGLOBIN: Status: RESOLVED | Noted: 2021-08-26 | Resolved: 2022-05-25

## 2022-05-25 PROBLEM — S83.242A ACUTE MEDIAL MENISCUS TEAR OF LEFT KNEE: Status: RESOLVED | Noted: 2022-05-25 | Resolved: 2022-05-25

## 2022-05-25 PROBLEM — L03.114 CELLULITIS OF LEFT UPPER LIMB: Status: RESOLVED | Noted: 2022-05-25 | Resolved: 2022-05-25

## 2022-05-25 LAB
BILIRUB UR QL STRIP.AUTO: NEGATIVE
COLOR UR AUTO: YELLOW
GLUCOSE UR STRIP.AUTO-MCNC: NEGATIVE MG/DL
KETONES UR STRIP.AUTO-MCNC: NEGATIVE MG/DL
LEUKOCYTE ESTERASE UR QL STRIP.AUTO: NEGATIVE
NITRITE UR QL STRIP.AUTO: NEGATIVE
PH UR STRIP.AUTO: 7 [PH] (ref 5–8)
PROT UR STRIP.AUTO-MCNC: 100 MG/DL
RBC UR QL AUTO: NEGATIVE
SP GR UR STRIP.AUTO: 1.02 (ref 1–1.03)
UROBILINOGEN UR STRIP.AUTO-MCNC: <2 MG/DL

## 2022-05-25 PROCEDURE — 81001 URINALYSIS AUTO W/SCOPE: CPT | Performed by: NURSE PRACTITIONER

## 2022-05-25 PROCEDURE — 96160 PT-FOCUSED HLTH RISK ASSMT: CPT | Performed by: NURSE PRACTITIONER

## 2022-05-25 PROCEDURE — 3079F DIAST BP 80-89 MM HG: CPT | Performed by: NURSE PRACTITIONER

## 2022-05-25 PROCEDURE — G0439 PPPS, SUBSEQ VISIT: HCPCS | Performed by: NURSE PRACTITIONER

## 2022-05-25 PROCEDURE — 99397 PER PM REEVAL EST PAT 65+ YR: CPT | Performed by: NURSE PRACTITIONER

## 2022-05-25 PROCEDURE — 3075F SYST BP GE 130 - 139MM HG: CPT | Performed by: NURSE PRACTITIONER

## 2022-05-25 PROCEDURE — 3008F BODY MASS INDEX DOCD: CPT | Performed by: NURSE PRACTITIONER

## 2022-05-25 RX ORDER — CITALOPRAM 40 MG/1
60 TABLET ORAL DAILY
Qty: 135 TABLET | Refills: 1 | Status: SHIPPED | OUTPATIENT
Start: 2022-05-25

## 2022-05-25 RX ORDER — HYDROCORTISONE 25 MG/ML
1 LOTION TOPICAL 2 TIMES DAILY
Qty: 118 ML | Refills: 3 | Status: SHIPPED | OUTPATIENT
Start: 2022-05-25

## 2022-05-26 ENCOUNTER — TELEPHONE (OUTPATIENT)
Dept: FAMILY MEDICINE CLINIC | Facility: CLINIC | Age: 67
End: 2022-05-26

## 2022-05-26 NOTE — TELEPHONE ENCOUNTER
----- Message from TREVON Patiño sent at 5/26/2022  9:29 AM CDT -----  Please make sure patient receives measure messages below-Your urinalysis shows that you have protein in your urine this can be an indication of kidney problems-would recommend increasing fluids lets repeat your urine next week. Do you have a nephrologist/urologist?  If so-would recommend that you follow-up with them. Are you having any urinary tract infection symptoms such as burning, frequency, urgency, etc.? Thank TREVON Reed, FNP-BC

## 2022-05-31 ENCOUNTER — TELEPHONE (OUTPATIENT)
Dept: FAMILY MEDICINE CLINIC | Facility: CLINIC | Age: 67
End: 2022-05-31

## 2022-05-31 DIAGNOSIS — F32.4 MAJOR DEPRESSIVE DISORDER IN PARTIAL REMISSION, UNSPECIFIED WHETHER RECURRENT (HCC): Chronic | ICD-10-CM

## 2022-05-31 RX ORDER — CITALOPRAM 40 MG/1
40 TABLET ORAL DAILY
Qty: 90 TABLET | Refills: 1 | Status: SHIPPED | OUTPATIENT
Start: 2022-05-31

## 2022-05-31 NOTE — TELEPHONE ENCOUNTER
Spoke to pts wife- he was not available    States that she will let him know the information and he will call us back or send a message via [employer]

## 2022-05-31 NOTE — TELEPHONE ENCOUNTER
Patient called back and let me know that he wanted to continue to take the 40mg of citalopram,     He also understands that we should wait until October for the cologaurd

## 2022-07-14 DIAGNOSIS — M10.9 GOUT, UNSPECIFIED CAUSE, UNSPECIFIED CHRONICITY, UNSPECIFIED SITE: ICD-10-CM

## 2022-07-14 DIAGNOSIS — I10 BENIGN HYPERTENSION: ICD-10-CM

## 2022-07-14 DIAGNOSIS — E03.9 ACQUIRED HYPOTHYROIDISM: ICD-10-CM

## 2022-07-14 NOTE — TELEPHONE ENCOUNTER
Last appt 5/25/22 advised Return in about 6 months (around 11/25/2022). Future appt:    Last Appointment with provider:   Visit date not found  Last appointment at [facility]:  5/25/2022  Cholesterol, Total (mg/dL)   Date Value   05/19/2022 124     CHOLESTEROL, TOTAL (mg/dL)   Date Value   11/04/2019 126     HDL Cholesterol (mg/dL)   Date Value   05/19/2022 42     HDL CHOLESTEROL (mg/dL)   Date Value   11/04/2019 42     LDL Cholesterol (mg/dL)   Date Value   05/19/2022 63     LDL-CHOLESTEROL (mg/dL (calc))   Date Value   11/04/2019 68     Triglycerides (mg/dL)   Date Value   05/19/2022 101     TRIGLYCERIDES (mg/dL)   Date Value   11/04/2019 84     No results found for: EAG, A1C  Lab Results   Component Value Date    T4F 1.2 05/19/2022    TSH 1.440 05/19/2022    TSHT4 0.92 11/04/2019       No follow-ups on file.

## 2022-07-15 RX ORDER — ALLOPURINOL 300 MG/1
TABLET ORAL
Qty: 90 TABLET | Refills: 1 | Status: SHIPPED | OUTPATIENT
Start: 2022-07-15

## 2022-07-15 RX ORDER — FUROSEMIDE 80 MG
TABLET ORAL
Qty: 90 TABLET | Refills: 1 | Status: SHIPPED | OUTPATIENT
Start: 2022-07-15

## 2022-07-15 RX ORDER — SPIRONOLACTONE 100 MG/1
TABLET, FILM COATED ORAL
Qty: 90 TABLET | Refills: 1 | Status: SHIPPED | OUTPATIENT
Start: 2022-07-15

## 2022-07-15 RX ORDER — LEVOTHYROXINE SODIUM 0.07 MG/1
TABLET ORAL
Qty: 90 TABLET | Refills: 1 | Status: SHIPPED | OUTPATIENT
Start: 2022-07-15

## 2022-07-28 ENCOUNTER — TELEPHONE (OUTPATIENT)
Dept: FAMILY MEDICINE CLINIC | Facility: CLINIC | Age: 67
End: 2022-07-28

## 2022-07-28 NOTE — TELEPHONE ENCOUNTER
Fax received from [employer] informed Eric Lockwood that patient has not completed his Cologuard. LM for patient to return call.

## 2022-07-29 NOTE — TELEPHONE ENCOUNTER
Spoke with patient. States she was informed that his insurance will not cover the test until at least 10/24/2022. Patient states she still plan to do the test but will need to wait until after then.

## 2022-08-01 DIAGNOSIS — I48.11 LONGSTANDING PERSISTENT ATRIAL FIBRILLATION (HCC): ICD-10-CM

## 2022-08-01 DIAGNOSIS — K21.9 GASTROESOPHAGEAL REFLUX DISEASE, UNSPECIFIED WHETHER ESOPHAGITIS PRESENT: ICD-10-CM

## 2022-08-02 RX ORDER — OMEPRAZOLE 20 MG/1
CAPSULE, DELAYED RELEASE ORAL
Qty: 90 CAPSULE | Refills: 1 | Status: SHIPPED | OUTPATIENT
Start: 2022-08-02

## 2022-08-02 RX ORDER — AMIODARONE HYDROCHLORIDE 200 MG/1
TABLET ORAL
Qty: 45 TABLET | Refills: 1 | Status: SHIPPED | OUTPATIENT
Start: 2022-08-02

## 2022-09-19 DIAGNOSIS — M47.816 OSTEOARTHRITIS OF LUMBAR SPINE, UNSPECIFIED SPINAL OSTEOARTHRITIS COMPLICATION STATUS: ICD-10-CM

## 2022-09-19 RX ORDER — GABAPENTIN 400 MG/1
CAPSULE ORAL
Qty: 270 CAPSULE | Refills: 0 | Status: SHIPPED | OUTPATIENT
Start: 2022-09-19

## 2022-09-19 NOTE — TELEPHONE ENCOUNTER
[employer] Message Sent to schedule follow up. Gabapentin: 1/24/22    Return in about 6 months (around 11/25/2022). Future appt:    Last Appointment with provider:   Visit date not found  Last appointment at [facility] [place]:  5/25/2022  Farida Gordillo [facility]    Cholesterol, Total (mg/dL)   Date Value   05/19/2022 124     CHOLESTEROL, TOTAL (mg/dL)   Date Value   11/04/2019 126     HDL Cholesterol (mg/dL)   Date Value   05/19/2022 42     HDL CHOLESTEROL (mg/dL)   Date Value   11/04/2019 42     LDL Cholesterol (mg/dL)   Date Value   05/19/2022 63     LDL-CHOLESTEROL (mg/dL (calc))   Date Value   11/04/2019 68     Triglycerides (mg/dL)   Date Value   05/19/2022 101     TRIGLYCERIDES (mg/dL)   Date Value   11/04/2019 84     No results found for: EAG, A1C  Lab Results   Component Value Date    T4F 1.2 05/19/2022    TSH 1.440 05/19/2022    TSHT4 0.92 11/04/2019       No follow-ups on file.

## 2022-11-05 DIAGNOSIS — F32.4 MAJOR DEPRESSIVE DISORDER IN PARTIAL REMISSION, UNSPECIFIED WHETHER RECURRENT (HCC): Chronic | ICD-10-CM

## 2022-11-07 RX ORDER — CITALOPRAM 40 MG/1
TABLET ORAL
Qty: 90 TABLET | Refills: 0 | Status: SHIPPED | OUTPATIENT
Start: 2022-11-07

## 2022-11-07 NOTE — TELEPHONE ENCOUNTER
[employer] Message sent due for visit. Citalopram: 5/31/22       Return in about 6 months (around 11/25/2022). Future appt:    Last Appointment with provider:   Visit date not found  Last appointment at [facility] [place]:  5/25/2022  [facility] APRN  Cholesterol, Total (mg/dL)   Date Value   05/19/2022 124     CHOLESTEROL, TOTAL (mg/dL)   Date Value   11/04/2019 126     HDL Cholesterol (mg/dL)   Date Value   05/19/2022 42     HDL CHOLESTEROL (mg/dL)   Date Value   11/04/2019 42     LDL Cholesterol (mg/dL)   Date Value   05/19/2022 63     LDL-CHOLESTEROL (mg/dL (calc))   Date Value   11/04/2019 68     Triglycerides (mg/dL)   Date Value   05/19/2022 101     TRIGLYCERIDES (mg/dL)   Date Value   11/04/2019 84     No results found for: EAG, A1C  Lab Results   Component Value Date    T4F 1.2 05/19/2022    TSH 1.440 05/19/2022    TSHT4 0.92 11/04/2019       No follow-ups on file.

## 2022-12-11 DIAGNOSIS — M47.816 OSTEOARTHRITIS OF LUMBAR SPINE, UNSPECIFIED SPINAL OSTEOARTHRITIS COMPLICATION STATUS: ICD-10-CM

## 2022-12-12 RX ORDER — GABAPENTIN 400 MG/1
CAPSULE ORAL
Qty: 270 CAPSULE | Refills: 0 | Status: SHIPPED | OUTPATIENT
Start: 2022-12-12

## 2022-12-12 NOTE — TELEPHONE ENCOUNTER
Last Refill: 9/19/2022 #270 with 0 refills    Future appt:    Last Appointment with provider:   Visit date not found  Last appointment at [facility] [place]:  Visit date not found  Cholesterol, Total (mg/dL)   Date Value   05/19/2022 124     CHOLESTEROL, TOTAL (mg/dL)   Date Value   11/04/2019 126     HDL Cholesterol (mg/dL)   Date Value   05/19/2022 42     HDL CHOLESTEROL (mg/dL)   Date Value   11/04/2019 42     LDL Cholesterol (mg/dL)   Date Value   05/19/2022 63     LDL-CHOLESTEROL (mg/dL (calc))   Date Value   11/04/2019 68     Triglycerides (mg/dL)   Date Value   05/19/2022 101     TRIGLYCERIDES (mg/dL)   Date Value   11/04/2019 84     No results found for: EAG, A1C  Lab Results   Component Value Date    T4F 1.2 05/19/2022    TSH 1.440 05/19/2022    TSHT4 0.92 11/04/2019       No follow-ups on file.

## 2022-12-12 NOTE — TELEPHONE ENCOUNTER
Patient is due for a med check appointment with Dr. Sharita Klein please schedule him-I will give him 1 refill.

## 2023-01-03 ENCOUNTER — TELEPHONE (OUTPATIENT)
Dept: FAMILY MEDICINE CLINIC | Facility: CLINIC | Age: 68
End: 2023-01-03

## 2023-01-03 NOTE — TELEPHONE ENCOUNTER
Please notify patient we received a fax from [facility] recommending that patient should take caution with taking citalopram and amiodarone as they could cause prolonged QT interval on EKG. Amiodarone is typically prescribed by cardiologist-please have patient follow-up with his cardiologist if he prescribed this medication-or with Dr. Fito Maldonado if he is the prescriber.

## 2023-01-20 DIAGNOSIS — J44.9 COPD, SEVERE (HCC): ICD-10-CM

## 2023-01-20 RX ORDER — BUDESONIDE AND FORMOTEROL FUMARATE DIHYDRATE 80; 4.5 UG/1; UG/1
2 AEROSOL RESPIRATORY (INHALATION) 2 TIMES DAILY
Qty: 3 EACH | Refills: 0 | Status: SHIPPED | OUTPATIENT
Start: 2023-01-20

## 2023-01-20 NOTE — TELEPHONE ENCOUNTER
Future appt: Your appointments     Date & Time Appointment Department [facility])    Feb 15, 2023  6:15 PM CST Follow Up Visit with Priti Medina MD [address] [place] [address], [facility] ([place]an)    Contact your primary care provider if your insurance requires a referral.    Please arrive 15 minutes prior to your scheduled appointment. Be sure to bring your current Insurance card, Photo ID, and medication bottles or a list of your current medications. A 24 hour notice is required to cancel any appointment or you may be charged a $40 No Show Fee. Important: 24 hour notice is required to cancel any appointment or you may be charged a $40 No Show Fee. Please notify your physician office. [address] [facility] [address] [id number] [phone]        Last Appointment with provider:   Visit date not found  Last appointment at [facility] [place]:  Visit date not found  Cholesterol, Total (mg/dL)   Date Value   05/19/2022 124     CHOLESTEROL, TOTAL (mg/dL)   Date Value   11/04/2019 126     HDL Cholesterol (mg/dL)   Date Value   05/19/2022 42     HDL CHOLESTEROL (mg/dL)   Date Value   11/04/2019 42     LDL Cholesterol (mg/dL)   Date Value   05/19/2022 63     LDL-CHOLESTEROL (mg/dL (calc))   Date Value   11/04/2019 68     Triglycerides (mg/dL)   Date Value   05/19/2022 101     TRIGLYCERIDES (mg/dL)   Date Value   11/04/2019 84     No results found for: EAG, A1C  Lab Results   Component Value Date    T4F 1.2 05/19/2022    TSH 1.440 05/19/2022    TSHT4 0.92 11/04/2019     Last RF:  2/9/2022    No follow-ups on file.

## 2023-01-24 ENCOUNTER — TELEPHONE (OUTPATIENT)
Dept: FAMILY MEDICINE CLINIC | Facility: CLINIC | Age: 68
End: 2023-01-24

## 2023-01-24 DIAGNOSIS — I10 BENIGN HYPERTENSION: ICD-10-CM

## 2023-01-24 DIAGNOSIS — M10.9 GOUT, UNSPECIFIED CAUSE, UNSPECIFIED CHRONICITY, UNSPECIFIED SITE: ICD-10-CM

## 2023-01-24 DIAGNOSIS — E03.9 ACQUIRED HYPOTHYROIDISM: ICD-10-CM

## 2023-01-24 DIAGNOSIS — I48.11 LONGSTANDING PERSISTENT ATRIAL FIBRILLATION (HCC): ICD-10-CM

## 2023-01-24 DIAGNOSIS — K21.9 GASTROESOPHAGEAL REFLUX DISEASE, UNSPECIFIED WHETHER ESOPHAGITIS PRESENT: ICD-10-CM

## 2023-01-24 NOTE — TELEPHONE ENCOUNTER
Future appt: Your appointments     Date & Time Appointment Department [facility])    Feb 15, 2023  6:15 PM CST Follow Up Visit with Trace Morel MD [address] [facility] [place] ([place])    Contact your primary care provider if your insurance requires a referral.    Please arrive 15 minutes prior to your scheduled appointment. Be sure to bring your current Insurance card, Photo ID, and medication bottles or a list of your current medications. A 24 hour notice is required to cancel any appointment or you may be charged a $40 No Show Fee. Important: 24 hour notice is required to cancel any appointment or you may be charged a $40 No Show Fee. Please notify your physician office. [address] [facility] [address] [id number] [phone]        Last Appointment with provider:   5/25/22([place])-f/u 6 mo(11/25/22)  [facility]  Visit date not found    Fax refill request for [facility]  AMIODARONE 200 MG Oral Tab    45tab  1refill        Filled:8/2/22 Summary: TAKE 1/2 TABLET DAILY        All last filled 6 months ago for 6 month supply    Cholesterol, Total (mg/dL)   Date Value   05/19/2022 124     CHOLESTEROL, TOTAL (mg/dL)   Date Value   11/04/2019 126     HDL Cholesterol (mg/dL)   Date Value   05/19/2022 42     HDL CHOLESTEROL (mg/dL)   Date Value   11/04/2019 42     LDL Cholesterol (mg/dL)   Date Value   05/19/2022 63     LDL-CHOLESTEROL (mg/dL (calc))   Date Value   11/04/2019 68     Triglycerides (mg/dL)   Date Value   05/19/2022 101     TRIGLYCERIDES (mg/dL)   Date Value   11/04/2019 84     No results found for: EAG, A1C  Lab Results   Component Value Date    T4F 1.2 05/19/2022    TSH 1.440 05/19/2022    TSHT4 0.92 11/04/2019       No follow-ups on file.

## 2023-01-24 NOTE — TELEPHONE ENCOUNTER
Fax received from [facility] is no longer preferred with pt insurance. Insurance prefers advair disks or advair hfa. Please review and send rx if appropriate.

## 2023-01-25 RX ORDER — FUROSEMIDE 80 MG
80 TABLET ORAL DAILY
Qty: 90 TABLET | Refills: 1 | Status: SHIPPED | OUTPATIENT
Start: 2023-01-25

## 2023-01-25 RX ORDER — OMEPRAZOLE 20 MG/1
20 CAPSULE, DELAYED RELEASE ORAL EVERY MORNING
Qty: 90 CAPSULE | Refills: 1 | Status: SHIPPED | OUTPATIENT
Start: 2023-01-25

## 2023-01-25 RX ORDER — AMIODARONE HYDROCHLORIDE 200 MG/1
100 TABLET ORAL DAILY
Qty: 45 TABLET | Refills: 1 | Status: SHIPPED | OUTPATIENT
Start: 2023-01-25

## 2023-01-25 RX ORDER — ALLOPURINOL 300 MG/1
300 TABLET ORAL DAILY
Qty: 90 TABLET | Refills: 1 | Status: SHIPPED | OUTPATIENT
Start: 2023-01-25

## 2023-01-25 RX ORDER — LEVOTHYROXINE SODIUM 0.07 MG/1
75 TABLET ORAL
Qty: 90 TABLET | Refills: 1 | Status: SHIPPED | OUTPATIENT
Start: 2023-01-25

## 2023-01-25 RX ORDER — SPIRONOLACTONE 100 MG/1
100 TABLET, FILM COATED ORAL DAILY
Qty: 90 TABLET | Refills: 1 | Status: SHIPPED | OUTPATIENT
Start: 2023-01-25

## 2023-01-25 NOTE — TELEPHONE ENCOUNTER
Did they send you a list of alternative inhalers that would be covered? Please call pharmacy to find out if any of the inhalers are covered.

## 2023-01-26 RX ORDER — FLUTICASONE PROPIONATE AND SALMETEROL 250; 50 UG/1; UG/1
1 POWDER RESPIRATORY (INHALATION) EVERY 12 HOURS SCHEDULED
Qty: 3 EACH | Refills: 1 | Status: SHIPPED | OUTPATIENT
Start: 2023-01-26

## 2023-01-26 NOTE — TELEPHONE ENCOUNTER
Prescription changed to Advair- patient is due for a physical in May - please have him schedule with Dr. Lidia James as he has not seen him since 2019

## 2023-01-26 NOTE — TELEPHONE ENCOUNTER
Pt has seen [facility]    Future Appointments   Date Time Provider Shyam Fuentes   2/15/2023  6:15 PM Lucy Avery MD [facility] [place] [facility]

## 2023-02-15 ENCOUNTER — TELEPHONE (OUTPATIENT)
Dept: FAMILY MEDICINE CLINIC | Facility: CLINIC | Age: 68
End: 2023-02-15

## 2023-02-15 NOTE — TELEPHONE ENCOUNTER
Insurance only covers brand Symbicort not generic. Reach out to pharmacy with new Rx for brand name instead of generic.

## 2023-02-16 RX ORDER — BUDESONIDE AND FORMOTEROL FUMARATE DIHYDRATE 80; 4.5 UG/1; UG/1
2 AEROSOL RESPIRATORY (INHALATION) 2 TIMES DAILY
Qty: 1 EACH | Refills: 0 | Status: SHIPPED | OUTPATIENT
Start: 2023-02-16

## 2023-02-16 NOTE — TELEPHONE ENCOUNTER
Refill sent for just 1 inhaler-patient had an appointment scheduled with Dr. Vasu Jimenez yesterday that he canceled he now has an appointment with Dr. Vasu Jimenez in [facility] let patient know he needs to keep this appointment no more refills will be given until he sees his doctor.

## 2023-03-06 DIAGNOSIS — M47.816 OSTEOARTHRITIS OF LUMBAR SPINE, UNSPECIFIED SPINAL OSTEOARTHRITIS COMPLICATION STATUS: ICD-10-CM

## 2023-03-06 NOTE — TELEPHONE ENCOUNTER
Future appt: Your appointments     Date & Time Appointment Department [facility])    Mar 22, 2023  5:30 PM CDT Follow Up Visit with MD Bridgette Khan, Nikky Ayoub ([place])    Contact your primary care provider if your insurance requires a referral.    Please arrive 15 minutes prior to your scheduled appointment. Be sure to bring your current Insurance card, Photo ID, and medication bottles or a list of your current medications. A 24 hour notice is required to cancel any appointment or you may be charged a $40 No Show Fee. Important: 24 hour notice is required to cancel any appointment or you may be charged a $40 No Show Fee. Please notify your physician office. Bridgette Walton, [facility] [address] [phone]  [phone]        Last Appointment with provider:  5/25/22(PX)-f/u 6mo(11/25/22)  Last appointment at [facility] [place]:  Visit date not found    GABAPENTIN 400 MG Oral Cap    270cap  0refill        Filled:12/12/22 Summary: TAKE 1 CAPSULE 3 TIMES A D          Cholesterol, Total (mg/dL)   Date Value   05/19/2022 124     CHOLESTEROL, TOTAL (mg/dL)   Date Value   11/04/2019 126     HDL Cholesterol (mg/dL)   Date Value   05/19/2022 42     HDL CHOLESTEROL (mg/dL)   Date Value   11/04/2019 42     LDL Cholesterol (mg/dL)   Date Value   05/19/2022 63     LDL-CHOLESTEROL (mg/dL (calc))   Date Value   11/04/2019 68     Triglycerides (mg/dL)   Date Value   05/19/2022 101     TRIGLYCERIDES (mg/dL)   Date Value   11/04/2019 84     No results found for: EAG, A1C  Lab Results   Component Value Date    T4F 1.2 05/19/2022    TSH 1.440 05/19/2022    TSHT4 0.92 11/04/2019       No follow-ups on file.

## 2023-03-07 RX ORDER — GABAPENTIN 400 MG/1
400 CAPSULE ORAL 3 TIMES DAILY
Qty: 270 CAPSULE | Refills: 0 | Status: SHIPPED | OUTPATIENT
Start: 2023-03-07

## 2023-03-07 NOTE — TELEPHONE ENCOUNTER
Prescription sent-please notify patient he must keep his appointment-no more prescriptions will be sent until he is seen.

## 2023-03-22 ENCOUNTER — OFFICE VISIT (OUTPATIENT)
Dept: FAMILY MEDICINE CLINIC | Facility: CLINIC | Age: 68
End: 2023-03-22
Payer: MEDICARE

## 2023-03-22 VITALS
HEIGHT: 66.5 IN | RESPIRATION RATE: 22 BRPM | DIASTOLIC BLOOD PRESSURE: 86 MMHG | BODY MASS INDEX: 50.03 KG/M2 | WEIGHT: 315 LBS | HEART RATE: 78 BPM | SYSTOLIC BLOOD PRESSURE: 136 MMHG | OXYGEN SATURATION: 97 % | TEMPERATURE: 97 F

## 2023-03-22 DIAGNOSIS — Z79.899 HIGH RISK MEDICATION USE: ICD-10-CM

## 2023-03-22 DIAGNOSIS — N18.30 CHRONIC RENAL INSUFFICIENCY, STAGE 3 (MODERATE) (HCC): ICD-10-CM

## 2023-03-22 DIAGNOSIS — E66.01 CLASS 3 SEVERE OBESITY DUE TO EXCESS CALORIES WITH SERIOUS COMORBIDITY AND BODY MASS INDEX (BMI) OF 60.0 TO 69.9 IN ADULT (HCC): ICD-10-CM

## 2023-03-22 DIAGNOSIS — M47.816 OSTEOARTHRITIS OF LUMBAR SPINE, UNSPECIFIED SPINAL OSTEOARTHRITIS COMPLICATION STATUS: Primary | ICD-10-CM

## 2023-03-22 DIAGNOSIS — F34.1 DYSTHYMIA: ICD-10-CM

## 2023-03-22 DIAGNOSIS — E03.9 ACQUIRED HYPOTHYROIDISM: ICD-10-CM

## 2023-03-22 DIAGNOSIS — M17.0 ARTHRITIS OF BOTH KNEES: ICD-10-CM

## 2023-03-22 DIAGNOSIS — I10 BENIGN ESSENTIAL HYPERTENSION: ICD-10-CM

## 2023-03-22 PROCEDURE — 3079F DIAST BP 80-89 MM HG: CPT | Performed by: FAMILY MEDICINE

## 2023-03-22 PROCEDURE — 3008F BODY MASS INDEX DOCD: CPT | Performed by: FAMILY MEDICINE

## 2023-03-22 PROCEDURE — 3075F SYST BP GE 130 - 139MM HG: CPT | Performed by: FAMILY MEDICINE

## 2023-03-22 PROCEDURE — 99215 OFFICE O/P EST HI 40 MIN: CPT | Performed by: FAMILY MEDICINE

## 2023-03-22 RX ORDER — NAPROXEN SODIUM 500 MG/1
500 TABLET, FILM COATED, EXTENDED RELEASE ORAL 2 TIMES DAILY
Qty: 90 TABLET | Refills: 1 | Status: SHIPPED | OUTPATIENT
Start: 2023-03-22

## 2023-03-22 RX ORDER — GABAPENTIN 300 MG/1
600 CAPSULE ORAL 3 TIMES DAILY
Qty: 270 CAPSULE | Refills: 3 | Status: SHIPPED | OUTPATIENT
Start: 2023-03-22

## 2023-03-22 NOTE — PATIENT INSTRUCTIONS
Good exam      Leg elevation 20 minutes twice daily     Appointment with orthopedist - dr. Tracee Welch - ? Due for injection. Increase dosing of gabapentin to help pain       Obtain labs - check thyroid dosing       Obtain appointment with cardiologist - wondering if you still need amiodarone.        Recheck for physical in July

## 2023-03-25 PROBLEM — F32.4 MAJOR DEPRESSIVE DISORDER IN PARTIAL REMISSION, UNSPECIFIED WHETHER RECURRENT (HCC): Status: ACTIVE | Noted: 2023-03-25

## 2023-04-19 DIAGNOSIS — F32.4 MAJOR DEPRESSIVE DISORDER IN PARTIAL REMISSION, UNSPECIFIED WHETHER RECURRENT (HCC): Chronic | ICD-10-CM

## 2023-04-19 RX ORDER — CITALOPRAM 40 MG/1
40 TABLET ORAL DAILY
Qty: 90 TABLET | Refills: 0 | Status: SHIPPED | OUTPATIENT
Start: 2023-04-19

## 2023-04-19 NOTE — TELEPHONE ENCOUNTER
Future appt: Your appointments     Date & Time Appointment Department [facility])    Jul 19, 2023  6:00 PM CDT MA Supervisit with Joby Gooden MD [address], [place] ([place])            [address], [facility]  [place] [address] [phone]  [phone]        Last Appointment with provider:   Visit date not found  Last appointment at [facility] [place]:  3/22/2023(F/U)-f/u in July for [facility] per [place]    CITALOPRAM 40 MG Oral Tab    90tab  0refill        Filled:11/7/22 Summary: TAKE 1 TABLET DAILY          Cholesterol, Total (mg/dL)   Date Value   05/19/2022 124     CHOLESTEROL, TOTAL (mg/dL)   Date Value   11/04/2019 126     HDL Cholesterol (mg/dL)   Date Value   05/19/2022 42     HDL CHOLESTEROL (mg/dL)   Date Value   11/04/2019 42     LDL Cholesterol (mg/dL)   Date Value   05/19/2022 63     LDL-CHOLESTEROL (mg/dL (calc))   Date Value   11/04/2019 68     Triglycerides (mg/dL)   Date Value   05/19/2022 101     TRIGLYCERIDES (mg/dL)   Date Value   11/04/2019 84     No results found for: EAG, A1C  Lab Results   Component Value Date    T4F 1.2 05/19/2022    TSH 1.440 05/19/2022    TSHT4 0.92 11/04/2019       No follow-ups on file.

## 2023-04-28 DIAGNOSIS — F32.4 MAJOR DEPRESSIVE DISORDER IN PARTIAL REMISSION, UNSPECIFIED WHETHER RECURRENT (HCC): Chronic | ICD-10-CM

## 2023-04-28 RX ORDER — CITALOPRAM 40 MG/1
TABLET ORAL
Qty: 90 TABLET | Refills: 0 | OUTPATIENT
Start: 2023-04-28

## 2023-05-03 ENCOUNTER — TELEPHONE (OUTPATIENT)
Dept: FAMILY MEDICINE CLINIC | Facility: CLINIC | Age: 68
End: 2023-05-03

## 2023-05-03 NOTE — TELEPHONE ENCOUNTER
Advised pt on care consideration received from [facility]. Pt has not had a chest x-ray with cardiologist.   Pt has appt with TR on 7/19/23, to discuss at visit.

## 2023-05-03 NOTE — TELEPHONE ENCOUNTER
Fax received from [facility]-care consideration-because patient is on amiodarone he should have an annual chest x-ray- has he had a chest x-ray with his cardiologist?  Patient has an appointment in July with Dr. Yeimi Garcia.

## 2023-05-26 DIAGNOSIS — M47.816 OSTEOARTHRITIS OF LUMBAR SPINE, UNSPECIFIED SPINAL OSTEOARTHRITIS COMPLICATION STATUS: ICD-10-CM

## 2023-05-26 RX ORDER — GABAPENTIN 400 MG/1
CAPSULE ORAL
Qty: 270 CAPSULE | Refills: 0 | OUTPATIENT
Start: 2023-05-26

## 2023-06-08 ENCOUNTER — TELEPHONE (OUTPATIENT)
Dept: FAMILY MEDICINE CLINIC | Facility: CLINIC | Age: 68
End: 2023-06-08

## 2023-06-08 NOTE — TELEPHONE ENCOUNTER
Patient states he has venous insuffiencey and ulcers to his lower left leg. He believes he has developed cellulitis. Leg is warm and red to the touch. Advised patient to go to an urgent or walk in clinic today. Patient verbalized understanding. No questions at this time.

## 2023-06-08 NOTE — TELEPHONE ENCOUNTER
Has spot on leg that has opened up- thinks it may be infected- warm to the touch and red around the area- wants to know what he should do

## 2023-07-11 NOTE — TELEPHONE ENCOUNTER
OK to close encounter. Provider notified & patient went to [address] [place] [address] ED on 6/9/2023.

## 2023-07-13 DIAGNOSIS — I48.11 LONGSTANDING PERSISTENT ATRIAL FIBRILLATION (HCC): ICD-10-CM

## 2023-07-13 DIAGNOSIS — M10.9 GOUT, UNSPECIFIED CAUSE, UNSPECIFIED CHRONICITY, UNSPECIFIED SITE: ICD-10-CM

## 2023-07-13 DIAGNOSIS — E03.9 ACQUIRED HYPOTHYROIDISM: ICD-10-CM

## 2023-07-13 DIAGNOSIS — K21.9 GASTROESOPHAGEAL REFLUX DISEASE, UNSPECIFIED WHETHER ESOPHAGITIS PRESENT: ICD-10-CM

## 2023-07-13 DIAGNOSIS — I10 BENIGN HYPERTENSION: ICD-10-CM

## 2023-07-13 RX ORDER — ALLOPURINOL 300 MG/1
300 TABLET ORAL DAILY
Qty: 90 TABLET | Refills: 0 | Status: SHIPPED | OUTPATIENT
Start: 2023-07-13

## 2023-07-13 RX ORDER — LEVOTHYROXINE SODIUM 0.07 MG/1
75 TABLET ORAL
Qty: 90 TABLET | Refills: 0 | Status: SHIPPED | OUTPATIENT
Start: 2023-07-13

## 2023-07-13 RX ORDER — FUROSEMIDE 80 MG
80 TABLET ORAL DAILY
Qty: 90 TABLET | Refills: 0 | Status: SHIPPED | OUTPATIENT
Start: 2023-07-13

## 2023-07-13 RX ORDER — SPIRONOLACTONE 100 MG/1
100 TABLET, FILM COATED ORAL DAILY
Qty: 90 TABLET | Refills: 0 | Status: SHIPPED | OUTPATIENT
Start: 2023-07-13

## 2023-07-13 RX ORDER — OMEPRAZOLE 20 MG/1
20 CAPSULE, DELAYED RELEASE ORAL EVERY MORNING
Qty: 90 CAPSULE | Refills: 0 | Status: SHIPPED | OUTPATIENT
Start: 2023-07-13

## 2023-07-13 RX ORDER — AMIODARONE HYDROCHLORIDE 200 MG/1
100 TABLET ORAL DAILY
Qty: 45 TABLET | Refills: 0 | Status: SHIPPED | OUTPATIENT
Start: 2023-07-13

## 2023-07-13 NOTE — TELEPHONE ENCOUNTER
Future appt: Your appointments       Date & Time Appointment Department [facility])    Jul 17, 2023  1:30 PM CDT Sleep Follow Up with [address], Kelli Oh, [address] ([facility])        Jul 19, 2023  6:00 PM CDT MA Supervisit with MD Bridgette Khan [place] ([facility])              Bridgette Walton, [facility] [place]  [facility] [id number]  [phone]          Last Appointment with provider:   5/25/22(PX)-f/u 6 mo(11/25/22)  Last appointment at [facility]:  3/22/2023(F/U)-rechk in July for [place]    All filled 1/25/23 for 6 mo supply    Cholesterol, Total (mg/dL)   Date Value   05/19/2022 124     CHOLESTEROL, TOTAL (mg/dL)   Date Value   11/04/2019 126     HDL Cholesterol (mg/dL)   Date Value   05/19/2022 42     HDL CHOLESTEROL (mg/dL)   Date Value   11/04/2019 42     LDL Cholesterol (mg/dL)   Date Value   05/19/2022 63     LDL-CHOLESTEROL (mg/dL (calc))   Date Value   11/04/2019 68     Triglycerides (mg/dL)   Date Value   05/19/2022 101     TRIGLYCERIDES (mg/dL)   Date Value   11/04/2019 84     No results found for: EAG, A1C  Lab Results   Component Value Date    T4F 1.2 05/19/2022    TSH 1.440 05/19/2022    TSHT4 0.92 11/04/2019       No follow-ups on file.

## 2023-07-17 ENCOUNTER — OFFICE VISIT (OUTPATIENT)
Dept: FAMILY MEDICINE CLINIC | Facility: CLINIC | Age: 68
End: 2023-07-17
Payer: MEDICARE

## 2023-07-17 VITALS
RESPIRATION RATE: 20 BRPM | OXYGEN SATURATION: 96 % | HEIGHT: 66.5 IN | WEIGHT: 315 LBS | SYSTOLIC BLOOD PRESSURE: 138 MMHG | TEMPERATURE: 98 F | DIASTOLIC BLOOD PRESSURE: 88 MMHG | HEART RATE: 61 BPM | BODY MASS INDEX: 50.03 KG/M2

## 2023-07-17 DIAGNOSIS — G47.33 OBSTRUCTIVE SLEEP APNEA (ADULT) (PEDIATRIC): Primary | ICD-10-CM

## 2023-07-17 PROCEDURE — 3075F SYST BP GE 130 - 139MM HG: CPT | Performed by: NURSE PRACTITIONER

## 2023-07-17 PROCEDURE — 3008F BODY MASS INDEX DOCD: CPT | Performed by: NURSE PRACTITIONER

## 2023-07-17 PROCEDURE — 1160F RVW MEDS BY RX/DR IN RCRD: CPT | Performed by: NURSE PRACTITIONER

## 2023-07-17 PROCEDURE — 99214 OFFICE O/P EST MOD 30 MIN: CPT | Performed by: NURSE PRACTITIONER

## 2023-07-17 PROCEDURE — 3079F DIAST BP 80-89 MM HG: CPT | Performed by: NURSE PRACTITIONER

## 2023-07-17 PROCEDURE — 1159F MED LIST DOCD IN RCRD: CPT | Performed by: NURSE PRACTITIONER

## 2023-07-17 PROCEDURE — 1170F FXNL STATUS ASSESSED: CPT | Performed by: NURSE PRACTITIONER

## 2023-07-19 ENCOUNTER — OFFICE VISIT (OUTPATIENT)
Dept: FAMILY MEDICINE CLINIC | Facility: CLINIC | Age: 68
End: 2023-07-19
Payer: MEDICARE

## 2023-07-19 VITALS
SYSTOLIC BLOOD PRESSURE: 136 MMHG | RESPIRATION RATE: 20 BRPM | WEIGHT: 315 LBS | OXYGEN SATURATION: 96 % | BODY MASS INDEX: 50.03 KG/M2 | HEART RATE: 55 BPM | HEIGHT: 66.5 IN | DIASTOLIC BLOOD PRESSURE: 82 MMHG | TEMPERATURE: 98 F

## 2023-07-19 DIAGNOSIS — I48.11 LONGSTANDING PERSISTENT ATRIAL FIBRILLATION (HCC): ICD-10-CM

## 2023-07-19 DIAGNOSIS — F32.4 MAJOR DEPRESSIVE DISORDER IN PARTIAL REMISSION, UNSPECIFIED WHETHER RECURRENT (HCC): ICD-10-CM

## 2023-07-19 DIAGNOSIS — G60.9 HEREDITARY AND IDIOPATHIC PERIPHERAL NEUROPATHY: ICD-10-CM

## 2023-07-19 DIAGNOSIS — E03.9 ACQUIRED HYPOTHYROIDISM: ICD-10-CM

## 2023-07-19 DIAGNOSIS — M10.9 GOUT, UNSPECIFIED CAUSE, UNSPECIFIED CHRONICITY, UNSPECIFIED SITE: ICD-10-CM

## 2023-07-19 DIAGNOSIS — N18.30 CHRONIC RENAL INSUFFICIENCY, STAGE 3 (MODERATE) (HCC): ICD-10-CM

## 2023-07-19 DIAGNOSIS — I10 BENIGN ESSENTIAL HYPERTENSION: ICD-10-CM

## 2023-07-19 DIAGNOSIS — J44.9 COPD, SEVERE (HCC): ICD-10-CM

## 2023-07-19 DIAGNOSIS — E66.01 MORBID OBESITY WITH BODY MASS INDEX OF 70 AND OVER IN ADULT (HCC): ICD-10-CM

## 2023-07-19 DIAGNOSIS — G89.4 CHRONIC PAIN SYNDROME: ICD-10-CM

## 2023-07-19 DIAGNOSIS — Z00.00 HEALTH CARE MAINTENANCE: ICD-10-CM

## 2023-07-19 DIAGNOSIS — Z23 NEED FOR VACCINATION FOR STREP PNEUMONIAE: Primary | ICD-10-CM

## 2023-07-19 PROBLEM — M19.019 LOCALIZED, PRIMARY OSTEOARTHRITIS OF SHOULDER REGION: Status: RESOLVED | Noted: 2022-05-25 | Resolved: 2023-07-19

## 2023-07-19 RX ORDER — ALLOPURINOL 100 MG/1
100 TABLET ORAL DAILY
Qty: 90 TABLET | Refills: 3 | Status: SHIPPED | OUTPATIENT
Start: 2023-07-19

## 2023-07-19 RX ORDER — BUDESONIDE AND FORMOTEROL FUMARATE DIHYDRATE 80; 4.5 UG/1; UG/1
2 AEROSOL RESPIRATORY (INHALATION) 2 TIMES DAILY
Qty: 3 EACH | Refills: 3 | Status: SHIPPED | OUTPATIENT
Start: 2023-07-19

## 2023-07-19 RX ORDER — KETOCONAZOLE 20 MG/G
1 CREAM TOPICAL DAILY
Qty: 60 G | Refills: 2 | Status: SHIPPED | OUTPATIENT
Start: 2023-07-19

## 2023-07-19 RX ORDER — AMIODARONE HYDROCHLORIDE 100 MG/1
50 TABLET ORAL DAILY
Qty: 45 TABLET | Refills: 3 | Status: SHIPPED | OUTPATIENT
Start: 2023-07-19

## 2023-07-19 NOTE — PROGRESS NOTES
Pt tolerated Prevnar 20 well. No reactions reported by pt. No reaction noted at injection- right deltoid. Pt left without incident.

## 2023-07-19 NOTE — PATIENT INSTRUCTIONS
Obtain pneumonia vaccination       Appointment with dr. Ghazala David for knee         Check lab in near future.      Decrease dosing of amiodarone     Decrease dosing of allopurinol       Recheck in 6 months

## 2023-07-20 PROBLEM — I73.00 RAYNAUD'S PHENOMENON WITHOUT GANGRENE: Status: RESOLVED | Noted: 2017-12-20 | Resolved: 2023-07-20

## 2023-07-20 PROBLEM — M25.569 ARTHRALGIA OF LOWER LEG: Status: RESOLVED | Noted: 2022-05-25 | Resolved: 2023-07-20

## 2023-09-18 DIAGNOSIS — F32.4 MAJOR DEPRESSIVE DISORDER IN PARTIAL REMISSION, UNSPECIFIED WHETHER RECURRENT (HCC): Chronic | ICD-10-CM

## 2023-09-18 DIAGNOSIS — M10.9 GOUT, UNSPECIFIED CAUSE, UNSPECIFIED CHRONICITY, UNSPECIFIED SITE: ICD-10-CM

## 2023-09-18 DIAGNOSIS — I48.11 LONGSTANDING PERSISTENT ATRIAL FIBRILLATION (HCC): ICD-10-CM

## 2023-09-18 RX ORDER — AMIODARONE HYDROCHLORIDE 200 MG/1
100 TABLET ORAL DAILY
Qty: 45 TABLET | Refills: 0 | OUTPATIENT
Start: 2023-09-18

## 2023-09-18 RX ORDER — ALLOPURINOL 300 MG/1
300 TABLET ORAL DAILY
Qty: 90 TABLET | Refills: 0 | OUTPATIENT
Start: 2023-09-18

## 2023-09-18 NOTE — TELEPHONE ENCOUNTER
Last PX-7/19/23-f/u 6mo  No future appointments. citalopram 40 MG Oral Tab  90tab  0refill    Filled:4/19/23              Summary: Take 1 tablet (40 mg total) by mouth daily            To soon to fill, Filled for 90tab w/ 3refill to [facility] mail service     allopurinol 100 MG Oral Tab  90tab  3refill    Filled:7/19/23              Summary: Take 1 tablet (100 mg total) by mouth daily. amiodarone 100 MG Oral Tab  90tab  3refill     Filled:7/19/23              Summary: Take 0.5 tablets (50 mg total) by mouth daily.

## 2023-09-19 RX ORDER — CITALOPRAM 40 MG/1
40 TABLET ORAL DAILY
Qty: 90 TABLET | Refills: 3 | Status: SHIPPED | OUTPATIENT
Start: 2023-09-19

## 2023-10-07 NOTE — TELEPHONE ENCOUNTER
See MyChart message from patient. He is asking for RF without coming in for an appt due to concerns regarding virus. Please advise. Future appt:    Last Appointment with provider:   10/11/2019; Recheck with me in 3 months.      Last appointment at [facility]
4/6 systolic murmur

## 2024-02-15 ENCOUNTER — TELEPHONE (OUTPATIENT)
Dept: FAMILY MEDICINE CLINIC | Facility: CLINIC | Age: 69
End: 2024-02-15

## 2025-02-12 NOTE — TELEPHONE ENCOUNTER
Please let wife know that I am able to pull him up on the modem. However it is always best to bring the SD card with just in case the Internet is not working when patient is here. PRE-OP DIAGNOSIS:  S/P panniculectomy 02-Jun-2022 16:28:19  Nilton Nunez   None

## 2025-04-10 NOTE — TELEPHONE ENCOUNTER
Patient is due for his 6-month check- can schedule Dr. [facility]                 Therapy Communication Note    Patient Name: Khadra Earl  MRN: [id number]  Department:   Room: Room/bed info not found  Today's Date: 4/10/2025     Discipline: Physical Therapy    Mised Time: Cancel- not feeling well

## (undated) DIAGNOSIS — I10 BENIGN HYPERTENSION: ICD-10-CM

## (undated) DIAGNOSIS — I48.11 LONGSTANDING PERSISTENT ATRIAL FIBRILLATION (HCC): ICD-10-CM

## (undated) DIAGNOSIS — K21.9 GASTROESOPHAGEAL REFLUX DISEASE, UNSPECIFIED WHETHER ESOPHAGITIS PRESENT: ICD-10-CM

## (undated) DIAGNOSIS — M10.9 GOUT, UNSPECIFIED CAUSE, UNSPECIFIED CHRONICITY, UNSPECIFIED SITE: ICD-10-CM

## (undated) DIAGNOSIS — J44.9 COPD, SEVERE (HCC): ICD-10-CM

## (undated) DIAGNOSIS — Z12.5 SCREENING PSA (PROSTATE SPECIFIC ANTIGEN): ICD-10-CM

## (undated) DIAGNOSIS — E03.9 ACQUIRED HYPOTHYROIDISM: Primary | ICD-10-CM

## (undated) DIAGNOSIS — D64.9 ANEMIA, UNSPECIFIED TYPE: ICD-10-CM

## (undated) DIAGNOSIS — E55.9 VITAMIN D DEFICIENCY: ICD-10-CM

## (undated) DIAGNOSIS — E66.01 MORBID OBESITY (HCC): ICD-10-CM